# Patient Record
Sex: MALE | Race: WHITE | NOT HISPANIC OR LATINO | Employment: FULL TIME | URBAN - METROPOLITAN AREA
[De-identification: names, ages, dates, MRNs, and addresses within clinical notes are randomized per-mention and may not be internally consistent; named-entity substitution may affect disease eponyms.]

---

## 2021-08-02 ENCOUNTER — OFFICE VISIT (OUTPATIENT)
Dept: FAMILY MEDICINE | Facility: CLINIC | Age: 56
End: 2021-08-02
Payer: COMMERCIAL

## 2021-08-02 VITALS
HEART RATE: 84 BPM | OXYGEN SATURATION: 97 % | HEIGHT: 72 IN | BODY MASS INDEX: 30.2 KG/M2 | DIASTOLIC BLOOD PRESSURE: 84 MMHG | TEMPERATURE: 98.5 F | WEIGHT: 223 LBS | SYSTOLIC BLOOD PRESSURE: 184 MMHG

## 2021-08-02 DIAGNOSIS — Z00.00 HEALTH MAINTENANCE EXAMINATION: Primary | ICD-10-CM

## 2021-08-02 DIAGNOSIS — Z12.5 SCREENING FOR PROSTATE CANCER: ICD-10-CM

## 2021-08-02 DIAGNOSIS — Z12.11 COLON CANCER SCREENING: ICD-10-CM

## 2021-08-02 DIAGNOSIS — M10.9 GOUT, UNSPECIFIED CAUSE, UNSPECIFIED CHRONICITY, UNSPECIFIED SITE: ICD-10-CM

## 2021-08-02 DIAGNOSIS — I10 BENIGN ESSENTIAL HYPERTENSION: ICD-10-CM

## 2021-08-02 LAB
ERYTHROCYTE [DISTWIDTH] IN BLOOD BY AUTOMATED COUNT: 12.4 % (ref 10–15)
HCT VFR BLD AUTO: 38.9 % (ref 40–53)
HGB BLD-MCNC: 13 G/DL (ref 13.3–17.7)
MCH RBC QN AUTO: 31.3 PG (ref 26.5–33)
MCHC RBC AUTO-ENTMCNC: 33.4 G/DL (ref 31.5–36.5)
MCV RBC AUTO: 94 FL (ref 78–100)
PLATELET # BLD AUTO: 185 10E3/UL (ref 150–450)
RBC # BLD AUTO: 4.16 10E6/UL (ref 4.4–5.9)
WBC # BLD AUTO: 8.2 10E3/UL (ref 4–11)

## 2021-08-02 PROCEDURE — 85027 COMPLETE CBC AUTOMATED: CPT | Performed by: FAMILY MEDICINE

## 2021-08-02 PROCEDURE — 82274 ASSAY TEST FOR BLOOD FECAL: CPT | Performed by: FAMILY MEDICINE

## 2021-08-02 PROCEDURE — 80061 LIPID PANEL: CPT | Performed by: FAMILY MEDICINE

## 2021-08-02 PROCEDURE — 99386 PREV VISIT NEW AGE 40-64: CPT | Performed by: FAMILY MEDICINE

## 2021-08-02 PROCEDURE — 80053 COMPREHEN METABOLIC PANEL: CPT | Performed by: FAMILY MEDICINE

## 2021-08-02 PROCEDURE — G0103 PSA SCREENING: HCPCS | Performed by: FAMILY MEDICINE

## 2021-08-02 PROCEDURE — 36415 COLL VENOUS BLD VENIPUNCTURE: CPT | Performed by: FAMILY MEDICINE

## 2021-08-02 PROCEDURE — 84550 ASSAY OF BLOOD/URIC ACID: CPT | Performed by: FAMILY MEDICINE

## 2021-08-02 RX ORDER — LISINOPRIL 20 MG/1
20 TABLET ORAL DAILY
Qty: 90 TABLET | Refills: 1 | Status: SHIPPED | OUTPATIENT
Start: 2021-08-02 | End: 2022-01-22

## 2021-08-02 RX ORDER — LISINOPRIL 20 MG/1
20 TABLET ORAL
COMMUNITY
Start: 2021-03-12 | End: 2021-08-02

## 2021-08-02 RX ORDER — AMLODIPINE BESYLATE 10 MG/1
10 TABLET ORAL
COMMUNITY
Start: 2021-03-12 | End: 2021-08-02

## 2021-08-02 RX ORDER — AMLODIPINE BESYLATE 10 MG/1
10 TABLET ORAL DAILY
Qty: 90 TABLET | Refills: 1 | Status: SHIPPED | OUTPATIENT
Start: 2021-08-02 | End: 2022-01-22

## 2021-08-02 ASSESSMENT — ENCOUNTER SYMPTOMS
HEADACHES: 0
FREQUENCY: 0
HEMATOCHEZIA: 0
COUGH: 0
HEMATURIA: 0
CONSTIPATION: 0
HEARTBURN: 0
EYE PAIN: 0
WEAKNESS: 0
PARESTHESIAS: 0
ARTHRALGIAS: 0
NERVOUS/ANXIOUS: 1
MYALGIAS: 0
ABDOMINAL PAIN: 0
NAUSEA: 0
CHILLS: 0
JOINT SWELLING: 1
DIARRHEA: 0
DYSURIA: 0
PALPITATIONS: 0
SHORTNESS OF BREATH: 0
SORE THROAT: 0
FEVER: 0
DIZZINESS: 0

## 2021-08-02 ASSESSMENT — MIFFLIN-ST. JEOR: SCORE: 1879.52

## 2021-08-02 NOTE — LETTER
August 10, 2021      Keith Medeiros  [address]  [facility] [address]            Dear Keith,     Your FIT test for colon cancer screening is negative. Please plan annual surveillance with FIT test.     Thanks,     Resulted Orders   CBC with platelets   Result Value Ref Range    WBC Count 8.2 4.0 - 11.0 10e3/uL    RBC Count 4.16 (L) 4.40 - 5.90 10e6/uL    Hemoglobin 13.0 (L) 13.3 - 17.7 g/dL    Hematocrit 38.9 (L) 40.0 - 53.0 %    MCV 94 78 - 100 fL    MCH 31.3 26.5 - 33.0 pg    MCHC 33.4 31.5 - 36.5 g/dL    RDW 12.4 10.0 - 15.0 %    Platelet Count 185 150 - 450 10e3/uL   Comprehensive metabolic panel (BMP + Alb, Alk Phos, ALT, AST, Total. Bili, TP)   Result Value Ref Range    Sodium 138 133 - 144 mmol/L    Potassium 3.7 3.4 - 5.3 mmol/L    Chloride 107 94 - 109 mmol/L    Carbon Dioxide (CO2) 22 20 - 32 mmol/L    Anion Gap 9 3 - 14 mmol/L    Urea Nitrogen 11 7 - 30 mg/dL    Creatinine 1.01 0.66 - 1.25 mg/dL    Calcium 9.2 8.5 - 10.1 mg/dL    Glucose 92 70 - 99 mg/dL    Alkaline Phosphatase 57 40 - 150 U/L    AST 26 0 - 45 U/L    ALT 51 0 - 70 U/L    Protein Total 7.4 6.8 - 8.8 g/dL    Albumin 3.9 3.4 - 5.0 g/dL    Bilirubin Total 0.3 0.2 - 1.3 mg/dL    GFR Estimate 83 >60 mL/min/1.73m2      Comment:      As of July 11, 2021, eGFR is calculated by the CKD-EPI creatinine equation, without race adjustment. eGFR can be influenced by muscle mass, exercise, and diet. The reported eGFR is an estimation only and is only applicable if the renal function is stable.   Lipid panel reflex to direct LDL Fasting   Result Value Ref Range    Cholesterol 222 (H) <200 mg/dL      Comment:      Age 0-19 years  Desirable: <170 mg/dL  Borderline high:  170-199 mg/dl  High:            >199 mg/dl    Age 20 years and older  Desirable: <200 mg/dL    Triglycerides 336 (H) <150 mg/dL      Comment:      0-9 years:  Normal:    Less than 75 mg/dL  Borderline high:  75-99 mg/dL  High:             Greater than or equal to 100 mg/dL    0-19  years:  Normal:    Less than 90 mg/dL  Borderline high:   mg/dL  High:             Greater than or equal to 130 mg/dL    20 years and older:  Normal:    Less than 150 mg/dL  Borderline high:  150-199 mg/dL  High:             200-499 mg/dL  Very high:   Greater than or equal to 500 mg/dL    Direct Measure HDL 43 >=40 mg/dL      Comment:      0-19 years:       Greater than or equal to 45 mg/dL   Low: Less than 40 mg/dL   Borderline low: 40-44 mg/dL     20 years and older:   Female: Greater than or equal to 50 mg/dL   Male:   Greater than or equal to 40 mg/dL         LDL Cholesterol Calculated 112 (H) <=100 mg/dL      Comment:      Age 0-19 years:  Desirable: 0-110 mg/dL   Borderline high: 110-129 mg/dL   High: >= 130 mg/dL    Age 20 years and older:  Desirable: <100mg/dL  Above desirable: 100-129 mg/dL   Borderline high: 130-159 mg/dL   High: 160-189 mg/dL   Very high: >= 190 mg/dL    Non HDL Cholesterol 179 (H) <130 mg/dL      Comment:      0-19 years:  Desirable:          Less than 120 mg/dL  Borderline high:   120-144 mg/dL  High:                   Greater than or equal to 145 mg/dL    20 years and older:  Desirable:          130 mg/dL  Above Desirable: 130-159 mg/dL  Borderline high:   160-189 mg/dL  High:               190-219 mg/dL  Very high:     Greater than or equal to 220 mg/dL    Patient Fasting > 8hrs? No    PSA, screen   Result Value Ref Range    Prostate Specific Antigen Screen 2.20 0.00 - 4.00 ug/L   Uric acid   Result Value Ref Range    Uric Acid 6.8 3.5 - 7.2 mg/dL   Fecal colorectal cancer screen (FIT)   Result Value Ref Range    Occult Blood Screen FIT Negative Negative       If you have any questions or concerns, please call the clinic at the number listed above.       Sincerely,      Lang Hernadez MD

## 2021-08-02 NOTE — PROGRESS NOTES
SUBJECTIVE:   CC: Keith Medeiros is an 56 year old male who presents for preventative health visit.       Patient has been advised of split billing requirements and indicates understanding: Yes  Healthy Habits:     Getting at least 3 servings of Calcium per day:  Yes    Bi-annual eye exam:  Yes    Dental care twice a year:  NO    Sleep apnea or symptoms of sleep apnea:  None    Diet:  Regular (no restrictions)    Frequency of exercise:  None    Taking medications regularly:  Yes    Medication side effects:  None    PHQ-2 Total Score: 0    Additional concerns today:  No      Today's PHQ-2 Score:   PHQ-2 ( 1999 [employer]) 8/2/2021   Q1: Little interest or pleasure in doing things 0   Q2: Feeling down, depressed or hopeless 0   PHQ-2 Score 0   Q1: Little interest or pleasure in doing things Not at all   Q2: Feeling down, depressed or hopeless Not at all   PHQ-2 Score 0       Abuse: Current or Past(Physical, Sexual or Emotional)- No  Do you feel safe in your environment? Yes        Social History     Tobacco Use     Smoking status: Current Every Day Smoker     Types: Cigars   Substance Use Topics     Alcohol use: Yes         Alcohol Use 8/2/2021   Prescreen: >3 drinks/day or >7 drinks/week? Yes   AUDIT SCORE  12     AUDIT - Alcohol Use Disorders Identification Test - Reproduced from the World Health Organization Audit 2001 (Second Edition) 8/2/2021   1.  How often do you have a drink containing alcohol? 2 to 3 times a week   2.  How many drinks containing alcohol do you have on a typical day when you are drinking? 5 or 6   3.  How often do you have five or more drinks on one occasion? Weekly   4.  How often during the last year have you found that you were not able to stop drinking once you had started? Never   5.  How often during the last year have you failed to do what was normally expected of you because of drinking? Never   6.  How often during the last year have you needed a first drink in the morning to get yourself  going after a heavy drinking session? Never   7.  How often during the last year have you had a feeling of guilt or remorse after drinking? Never   8.  How often during the last year have you been unable to remember what happened the night before because of your drinking? Never   9.  Have you or someone else been injured because of your drinking? No   10. Has a relative, friend, doctor or other health care worker been concerned about your drinking or suggested you cut down? Yes, during the last year   TOTAL SCORE 12       Last PSA: No results found for: PSA    Reviewed orders with patient. Reviewed health maintenance and updated orders accordingly - Yes  BP Readings from Last 3 Encounters:   08/02/21 (!) 184/84   05/31/07 133/84    Wt Readings from Last 3 Encounters:   08/02/21 101.2 kg (223 lb)   05/31/07 95.8 kg (211 lb 4 oz)                  There is no problem list on file for this patient.    No past surgical history on file.    Social History     Tobacco Use     Smoking status: Current Some Day Smoker     Types: Cigars     Smokeless tobacco: Never Used   Substance Use Topics     Alcohol use: Yes     No family history on file.      Current Outpatient Medications   Medication Sig Dispense Refill     amLODIPine (NORVASC) 10 MG tablet Take 1 tablet (10 mg) by mouth daily 90 tablet 1     lisinopril (ZESTRIL) 20 MG tablet Take 1 tablet (20 mg) by mouth daily 90 tablet 1     SEROQUEL 25 MG OR TABS 1 in the monring, 2 at night (Patient not taking: No sig reported) 0 0     No Known Allergies  No lab results found.     Reviewed and updated as needed this visit by clinical staff                 Reviewed and updated as needed this visit by Provider                No past medical history on file.   No past surgical history on file.    Review of Systems   Constitutional: Negative for chills and fever.   HENT: Negative for congestion, ear pain, hearing loss and sore throat.    Eyes: Negative for pain and visual disturbance.    Respiratory: Negative for cough and shortness of breath.    Cardiovascular: Negative for chest pain, palpitations and peripheral edema.   Gastrointestinal: Negative for abdominal pain, constipation, diarrhea, heartburn, hematochezia and nausea.   Genitourinary: Negative for dysuria, frequency, genital sores, hematuria and urgency.   Musculoskeletal: Positive for joint swelling. Negative for arthralgias and myalgias.   Skin: Negative for rash.   Neurological: Negative for dizziness, weakness, headaches and paresthesias.   Psychiatric/Behavioral: Negative for mood changes. The patient is nervous/anxious.      CONSTITUTIONAL: NEGATIVE for fever, chills, change in weight  INTEGUMENTARY/SKIN: NEGATIVE for worrisome rashes, moles or lesions  EYES: NEGATIVE for vision changes or irritation  ENT: NEGATIVE for ear, mouth and throat problems  RESP: NEGATIVE for significant cough or SOB  CV: NEGATIVE for chest pain, palpitations or peripheral edema  GI: NEGATIVE for nausea, abdominal pain, heartburn, or change in bowel habits   male: negative for dysuria, hematuria, decreased urinary stream, erectile dysfunction, urethral discharge  MUSCULOSKELETAL: NEGATIVE for significant arthralgias or myalgia  NEURO: NEGATIVE for weakness, dizziness or paresthesias  PSYCHIATRIC: NEGATIVE for changes in mood or affect    OBJECTIVE:   There were no vitals taken for this visit.    Physical Exam  GENERAL: healthy, alert and no distress  EYES: Eyes grossly normal to inspection, PERRL and conjunctivae and sclerae normal  HENT: ear canals and TM's normal, nose and mouth without ulcers or lesions  NECK: no adenopathy, no asymmetry, masses, or scars and thyroid normal to palpation  RESP: lungs clear to auscultation - no rales, rhonchi or wheezes  CV: regular rate and rhythm, normal S1 S2, no S3 or S4, no murmur, click or rub, no peripheral edema and peripheral pulses strong  ABDOMEN: soft, nontender, no hepatosplenomegaly, no masses and bowel  sounds normal   (male): normal male genitalia without lesions or urethral discharge, no hernia  MS: no gross musculoskeletal defects noted, no edema  SKIN: no suspicious lesions or rashes  NEURO: Normal strength and tone, mentation intact and speech normal  BACK: no CVA tenderness, no paralumbar tenderness  PSYCH: mentation appears normal, affect normal/bright  LYMPH: no cervical, supraclavicular, axillary, or inguinal adenopathy        ASSESSMENT/PLAN:       ICD-10-CM    1. Health maintenance examination  Z00.00 CBC with platelets     Comprehensive metabolic panel (BMP + Alb, Alk Phos, ALT, AST, Total. Bili, TP)     Lipid panel reflex to direct LDL Fasting     PSA, screen     Uric acid     Fecal colorectal cancer screen (FIT)     CBC with platelets     Comprehensive metabolic panel (BMP + Alb, Alk Phos, ALT, AST, Total. Bili, TP)     Lipid panel reflex to direct LDL Fasting     PSA, screen     Uric acid   2. Screening for prostate cancer  Z12.5 PSA, screen     PSA, screen   3. Gout, unspecified cause, unspecified chronicity, unspecified site  M10.9 Uric acid     Uric acid   4. Benign essential hypertension  I10 amLODIPine (NORVASC) 10 MG tablet     lisinopril (ZESTRIL) 20 MG tablet   5. Colon cancer screening  Z12.11 Fecal colorectal cancer screen (FIT)       Patient has been advised of split billing requirements and indicates understanding: Yes  COUNSELING:   Reviewed preventive health counseling, as reflected in patient instructions    Estimated body mass index is 30.24 kg/m  as calculated from the following:    Height as of this encounter: 1.829 m (6').    Weight as of this encounter: 101.2 kg (223 lb).     Weight management plan: Discussed healthy diet and exercise guidelines    He reports that he has been smoking cigars. He has never used smokeless tobacco.  Tobacco Cessation Action Plan:   Information offered: Patient not interested at this time      Counseling Resources:  ATP IV Guidelines  Pooled Cohorts  Equation Calculator  FRAX Risk Assessment  ICSI Preventive Guidelines  Dietary Guidelines for Americans, 2010  [employer]'s MyPlate  ASA Prophylaxis  Lung CA Screening    Lang Hernadez MD  [facility]

## 2021-08-03 LAB
ALBUMIN SERPL-MCNC: 3.9 G/DL (ref 3.4–5)
ALP SERPL-CCNC: 57 U/L (ref 40–150)
ALT SERPL W P-5'-P-CCNC: 51 U/L (ref 0–70)
ANION GAP SERPL CALCULATED.3IONS-SCNC: 9 MMOL/L (ref 3–14)
AST SERPL W P-5'-P-CCNC: 26 U/L (ref 0–45)
BILIRUB SERPL-MCNC: 0.3 MG/DL (ref 0.2–1.3)
BUN SERPL-MCNC: 11 MG/DL (ref 7–30)
CALCIUM SERPL-MCNC: 9.2 MG/DL (ref 8.5–10.1)
CHLORIDE BLD-SCNC: 107 MMOL/L (ref 94–109)
CHOLEST SERPL-MCNC: 222 MG/DL
CO2 SERPL-SCNC: 22 MMOL/L (ref 20–32)
CREAT SERPL-MCNC: 1.01 MG/DL (ref 0.66–1.25)
FASTING STATUS PATIENT QL REPORTED: NO
GFR SERPL CREATININE-BSD FRML MDRD: 83 ML/MIN/1.73M2
GLUCOSE BLD-MCNC: 92 MG/DL (ref 70–99)
HDLC SERPL-MCNC: 43 MG/DL
LDLC SERPL CALC-MCNC: 112 MG/DL
NONHDLC SERPL-MCNC: 179 MG/DL
POTASSIUM BLD-SCNC: 3.7 MMOL/L (ref 3.4–5.3)
PROT SERPL-MCNC: 7.4 G/DL (ref 6.8–8.8)
PSA SERPL-MCNC: 2.2 UG/L (ref 0–4)
SODIUM SERPL-SCNC: 138 MMOL/L (ref 133–144)
TRIGL SERPL-MCNC: 336 MG/DL
URATE SERPL-MCNC: 6.8 MG/DL (ref 3.5–7.2)

## 2021-08-06 ENCOUNTER — TELEPHONE (OUTPATIENT)
Dept: FAMILY MEDICINE | Facility: CLINIC | Age: 56
End: 2021-08-06

## 2021-08-06 DIAGNOSIS — M10.9 GOUT, UNSPECIFIED CAUSE, UNSPECIFIED CHRONICITY, UNSPECIFIED SITE: Primary | ICD-10-CM

## 2021-08-06 LAB — HEMOCCULT STL QL IA: NEGATIVE

## 2021-08-06 RX ORDER — ALLOPURINOL 100 MG/1
100 TABLET ORAL DAILY
Qty: 90 TABLET | Refills: 1 | Status: SHIPPED | OUTPATIENT
Start: 2021-08-06 | End: 2022-01-22

## 2021-08-06 NOTE — TELEPHONE ENCOUNTER
Patient was waiting for lab results to get a prescription for allopurinol    He is ok with that, he thought you would choose the dose based on his labs    Can anyone fill this as soon as possible---today  to [facility] [address]

## 2021-08-06 NOTE — TELEPHONE ENCOUNTER
Sending to triage to assess further, pt called back wanting this filled as soon as we can thank you.   Michelle Whitney

## 2021-08-06 NOTE — TELEPHONE ENCOUNTER
John Mendieta MD 11 minutes ago (12:50 PM)   AN     Medication 100 mg sent, he should follow up with his PCP in 3-6 months.        S/w pt and advised Dr. Mendieta sent in rx for allopurinol 100 mg take 1 tab daily # 90 with 1 refill and to f/u with Dr. Hernadez in 3-6 months.    Pt states understanding.    Alissa YEUNG RN  EP Triage

## 2021-08-06 NOTE — TELEPHONE ENCOUNTER
As was noted, your uric acid level is normal. However, your gout recur frequently, we may consider having you to take low dose of allopurinol. Please let us know your thoughts.   Message above copy and pasted from lab results.   Michelle Whitney

## 2021-08-06 NOTE — TELEPHONE ENCOUNTER
Routing to provider. Patient is interested in starting allopurinol. He does not have mychart and did not receive lab results.     Triage: Relay lab result message to patient when calling back about allopurinol rx.

## 2021-09-20 ENCOUNTER — TELEPHONE (OUTPATIENT)
Dept: FAMILY MEDICINE | Facility: CLINIC | Age: 56
End: 2021-09-20

## 2021-09-20 DIAGNOSIS — F32.1 CURRENT MODERATE EPISODE OF MAJOR DEPRESSIVE DISORDER, UNSPECIFIED WHETHER RECURRENT (H): Primary | ICD-10-CM

## 2021-09-20 NOTE — TELEPHONE ENCOUNTER
Pt calling requesting a referral to Mental Health for anxiety/depression and starting medication.    Referral pended.    Alissa YEUNG RN  EP Triage

## 2021-09-22 ENCOUNTER — VIRTUAL VISIT (OUTPATIENT)
Dept: PSYCHIATRY | Facility: CLINIC | Age: 56
End: 2021-09-22
Attending: FAMILY MEDICINE
Payer: COMMERCIAL

## 2021-09-22 ENCOUNTER — TELEPHONE (OUTPATIENT)
Dept: PSYCHIATRY | Facility: CLINIC | Age: 56
End: 2021-09-22

## 2021-09-22 DIAGNOSIS — F32.1 CURRENT MODERATE EPISODE OF MAJOR DEPRESSIVE DISORDER, UNSPECIFIED WHETHER RECURRENT (H): ICD-10-CM

## 2021-09-22 PROCEDURE — 90792 PSYCH DIAG EVAL W/MED SRVCS: CPT | Mod: 95 | Performed by: STUDENT IN AN ORGANIZED HEALTH CARE EDUCATION/TRAINING PROGRAM

## 2021-09-22 RX ORDER — SERTRALINE HYDROCHLORIDE 25 MG/1
TABLET, FILM COATED ORAL
Qty: 60 TABLET | Refills: 1 | Status: SHIPPED | OUTPATIENT
Start: 2021-09-22 | End: 2021-09-22

## 2021-09-22 NOTE — PROGRESS NOTES
"Keith Medeiros is a 56 year old male who is being evaluated via a billable video visit.      The patient has been notified of following:     \"This video visit will be conducted via a call between you and your physician/provider. We have found that certain health care needs can be provided without the need for an in-person physical exam.  This service lets us provide the care you need with a video conversation.  If a prescription is necessary we can send it directly to your pharmacy.  If lab work is needed we can place an order for that and you can then stop by our lab to have the test done at a later time.    If during the course of the call the physician/provider feels a video visit is not appropriate, you will not be charged for this service.\"     Physician has received verbal consent for a Video Visit from the patient? Yes    Patient would like the video invitation sent by: Send to e-mail at: [email or web address]  Video Start Time: September 22, 2021 10:18 AM  Video End Time: September 22, 2021 11:10 AM    Identifying Data:  Keith Medeiros complains of  No chief complaint on file.      Patient is a 56 year old    White  male  who presents for initial visit with me.  Patient is currently employed full time. Discussed limits of confidentiality today. Patient prefers to be called: \"Enrique.\"     I have reviewed and updated the patient's Past Medical History, Social History, Family History, Allergies, and Medication List.    Source of Referral:  Primary Care Provider: Lang Hernadez MD   Current Psychotherapist: none    Telemedicine Visit: The patient's condition can be safely assessed and treated via synchronous audio and visual telemedicine encounter.  Consultation will be provided at the request of Lang Hernadez MD for advice regarding the diagnosis and treatment of this patient's mental health needs. [facility] has written policies and procedures specific to telemedicine services that have been reviewed " "and are updated regularly. There are specific policies and procedures that adequately address patient safety before, during, and after the telemedicine service is rendered. As the provider, I attest to compliance with applicable laws and regulations related to telemedicine.     Reason for Telemedicine Visit: Patient convenience (e.g. access to timely appointments / distance to available provider) and current National State of Emergency with COVID19 and [place] Governor placed a state-wide stay home advisory where only essential personnel are to be in the community.    Originating Site (Patient Location): Patient's home     Distant Site (Provider Location): Provider Remote Setting- Home Office    Consent:  The patient/guardian has verbally consented to: the potential risks and benefits of telemedicine (video visit) versus in person care; bill my insurance or make self-payment for services provided; and responsibility for payment of non-covered services.     Mode of Communication:  Video Conference via TAPP. Please note we dealt with extensive technical difficulties causing late appointment start.    HPI:  He struggles at first to describe his exact symptoms, but then starts by talking about how he identifies as an alcoholic, and has not had a drink for 2 weeks now. He is \"tired of feeling this way\" and feels \"something has to change.\" Most concerning to him is how he feels \"exasperated with people for no reason.\" Feels like he is lashing out irrationally, doesn't know why he does it, then becomes cognizant of it and feels very guilty. 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    He feels that symptoms of anxiety, including nausea, feelings of dread, and \"feeling bad,\" and mood symptoms including irritability. He struggles to turn his brain off " "at night, ruminations keep him awake, often related to guilt about irritability. He denies SI or thoughts of death, motivated to live for his family but wants a better quality of life.    He thinks he was \"pretty anxiety-ridden, depressed kid from an early age, for no particular reason.\" Described childhood as happy, no trauma, no abuse. Feels he has been self-medicating with alcohol over this time, started drinking at age 14. Longest period of sobriety was about one year, when he was living in a sober house. He has been to inpatient/residential treatment several times. He does not like groups. He also felt that AA wasn't particularly helpful for him.      Past diagnoses include: anxiety, depression, alcohol use disorder  Current medications include: has a current medication list which includes the following prescription(s): allopurinol, amlodipine, lisinopril, and seroquel.    Medication side effects: n/a currently not on meds  Current stressors include: Caregiving Stress  Coping mechanisms and supports include: Drugs/Alcohol and Therapy    Psychiatric Review of Symptoms:  Depression: Interest: Decrease  Depressed Mood  Sleep: Decrease   Energy: Decrease  Guilt: Increase  Concentration: Decrease  Suicide: No  Irritability: Increase   Ruminations: Increase    PHQ-9 scores: No flowsheet data found.  Sydnie:  Irritability  Anxiety: Feeling nervous, anxious, or on edge  Uncontrolled worrying  Worrying too much about different things  Trouble relaxing  Easily annoyed or irritable   DAJUAN-7 scores:  No flowsheet data found.  Panic:  No symptoms   Agoraphobia:  No   PTSD:  No symptoms   OCD:  No symptoms   Psychosis: No symptoms   ADD / ADHD: Distractible  Gambling or shoplifting: No   Eating Disorder:  No symptoms  Sleep:   Trouble falling asleep    Behavioral Concerns: No     Psychiatric History:   Hospitalizations: None  Past Treatment: psychiatry  Suicide Attempts: No   Current Suicide Risk:  Suicide Assessment Completed " Today.  Self-injurious Behavior: Denies  Electroconvulsive Therapy (ECT) or Transcranial Magnetic Stimulation (TMS) treatment: No   GeneSight Genetic Testing: No   Attention Deficit Hyperactivity Disorder (ADHD) Testing: No   Neuropsychological Testing: No     Past medication trials include but are not limited to:   New Antidepressants:  Effexor (venlafaxine)  Newer Antipsychotics: Seroquel (quetiapine) for sleep    The [place] Prescription Monitoring Program has been reviewed and there are no concerns about diversionary activity for controlled substances at this time.  No data for controlled substances over the last one year.     Substance Use History:  Current use of drugs or alcohol: 2 weeks since last drink   Patient reported the following problems as a result of drinking: occupational / vocational problems and relationship problems.   Based on the clinical interview, there  are indications of drug or alcohol abuse. see HPI. Continue to monitor.   Discussed effect of substance use on overall health.   Tobacco use: No  Caffeine:  NO  Patient has received chemical dependency treatment in the past at 3 residential programs, a sober house for 1 year  Recovery Programming Involvement: None    Past Medical History:  No past medical history on file.   Surgery: No past surgical history on file.  Allergies: No Known Allergies  Primary Care Provider: Lang Hernadez MD  Seizures or Head Injury: No    Vital Signs:  Vitals: There were no vitals taken for this visit.  No vitals as this is a remote televisit.     Labs:  Most recent laboratory results reviewed and pertinent results include:   Office Visit on 08/02/2021   Component Date Value Ref Range Status     WBC Count 08/02/2021 8.2  4.0 - 11.0 10e3/uL Final     RBC Count 08/02/2021 4.16* 4.40 - 5.90 10e6/uL Final     Hemoglobin 08/02/2021 13.0* 13.3 - 17.7 g/dL Final     Hematocrit 08/02/2021 38.9* 40.0 - 53.0 % Final     MCV 08/02/2021 94  78 - 100 fL Final     MCH  08/02/2021 31.3  26.5 - 33.0 pg Final     MCHC 08/02/2021 33.4  31.5 - 36.5 g/dL Final     RDW 08/02/2021 12.4  10.0 - 15.0 % Final     Platelet Count 08/02/2021 185  150 - 450 10e3/uL Final     Sodium 08/02/2021 138  133 - 144 mmol/L Final     Potassium 08/02/2021 3.7  3.4 - 5.3 mmol/L Final     Chloride 08/02/2021 107  94 - 109 mmol/L Final     Carbon Dioxide (CO2) 08/02/2021 22  20 - 32 mmol/L Final     Anion Gap 08/02/2021 9  3 - 14 mmol/L Final     Urea Nitrogen 08/02/2021 11  7 - 30 mg/dL Final     Creatinine 08/02/2021 1.01  0.66 - 1.25 mg/dL Final     Calcium 08/02/2021 9.2  8.5 - 10.1 mg/dL Final     Glucose 08/02/2021 92  70 - 99 mg/dL Final     Alkaline Phosphatase 08/02/2021 57  40 - 150 U/L Final     AST 08/02/2021 26  0 - 45 U/L Final     ALT 08/02/2021 51  0 - 70 U/L Final     Protein Total 08/02/2021 7.4  6.8 - 8.8 g/dL Final     Albumin 08/02/2021 3.9  3.4 - 5.0 g/dL Final     Bilirubin Total 08/02/2021 0.3  0.2 - 1.3 mg/dL Final     GFR Estimate 08/02/2021 83  >60 mL/min/1.73m2 Final    As of July 11, 2021, eGFR is calculated by the CKD-EPI creatinine equation, without race adjustment. eGFR can be influenced by muscle mass, exercise, and diet. The reported eGFR is an estimation only and is only applicable if the renal function is stable.     Cholesterol 08/02/2021 222* <200 mg/dL Final    Age 0-19 years  Desirable: <170 mg/dL  Borderline high:  170-199 mg/dl  High:            >199 mg/dl    Age 20 years and older  Desirable: <200 mg/dL     Triglycerides 08/02/2021 336* <150 mg/dL Final    0-9 years:  Normal:    Less than 75 mg/dL  Borderline high:  75-99 mg/dL  High:             Greater than or equal to 100 mg/dL    0-19 years:  Normal:    Less than 90 mg/dL  Borderline high:   mg/dL  High:             Greater than or equal to 130 mg/dL    20 years and older:  Normal:    Less than 150 mg/dL  Borderline high:  150-199 mg/dL  High:             200-499 mg/dL  Very high:   Greater than or equal to  500 mg/dL     Direct Measure HDL 08/02/2021 43  >=40 mg/dL Final    0-19 years:       Greater than or equal to 45 mg/dL   Low: Less than 40 mg/dL   Borderline low: 40-44 mg/dL     20 years and older:   Female: Greater than or equal to 50 mg/dL   Male:   Greater than or equal to 40 mg/dL          LDL Cholesterol Calculated 08/02/2021 112* <=100 mg/dL Final    Age 0-19 years:  Desirable: 0-110 mg/dL   Borderline high: 110-129 mg/dL   High: >= 130 mg/dL    Age 20 years and older:  Desirable: <100mg/dL  Above desirable: 100-129 mg/dL   Borderline high: 130-159 mg/dL   High: 160-189 mg/dL   Very high: >= 190 mg/dL     Non HDL Cholesterol 08/02/2021 179* <130 mg/dL Final    0-19 years:  Desirable:          Less than 120 mg/dL  Borderline high:   120-144 mg/dL  High:                   Greater than or equal to 145 mg/dL    20 years and older:  Desirable:          130 mg/dL  Above Desirable: 130-159 mg/dL  Borderline high:   160-189 mg/dL  High:               190-219 mg/dL  Very high:     Greater than or equal to 220 mg/dL     Patient Fasting > 8hrs? 08/02/2021 No   Final     Prostate Specific Antigen Screen 08/02/2021 2.20  0.00 - 4.00 ug/L Final     Uric Acid 08/02/2021 6.8  3.5 - 7.2 mg/dL Final     Occult Blood Screen FIT 08/02/2021 Negative  Negative Final     Most recent EKG from 5/32/07 reviewed. QTc interval 446.  Review of Systems:  10 systems (general, cardiovascular, respiratory, eyes, ENT, endocrine, GI, , M/S, neurological) were reviewed. Most pertinent finding(s) is/are: nausea, decreased appetite . The remaining systems are all unremarkable.    Family History:   Patient reported family history includes: No family history on file.  Mental Illness History: Unknown  Substance Abuse History: Unknown  Suicide History: Denies  Medications: Unknown     Social History:   Current Living situation: [place] , [place] with Spouse/Partner . Feels safe at home.  Children: adult children, one daugther living with them now    Firearms/Weapons Access: No: Patient denies   Service: No    Legal History:  No: Patient denies any legal history    Significant Losses / Trauma / Abuse / Neglect Issues:  There are no indications or report of: significant losses, trauma, abuse or neglect.   Issues of possible neglect are not present.     Mental Status Examination:     Appearance:  awake, alert and neatly groomed  Attitude:  cooperative   Eye Contact:  fair  Psychomotor Behavior:  no evidence of tardive dyskinesia, dystonia, or tics  Oriented to:  time, person, and place  Attention Span and Concentration:  Adequate for interview  Speech:  clear, coherent and normal prosody  Mood:  anxious and sad   Affect:  mood congruent  Associations:  no loose associations  Thought Process:  logical and linear  Thought Content:  no evidence of suicidal ideation or homicidal ideation and no evidence of psychotic thought  Recent and Remote Memory: Not formally assessed. No amnesia.  Fund of Knowledge: appropriate  Insight:  good  Judgment:  intact, adequate for safety  Impulse Control:  intact  Language: Intact    Suicide Risk Assessment:  Today Keith Medeiros reports no SI. In addition, there are notable risk factors for self-harm, including age, anxiety, substance abuse and mood change. However, risk is mitigated by commitment to family, sobriety, ability to volunteer a safety plan, history of seeking help when needed, future oriented, no access to firearms or weapons, identifies reasons to live including family, no family history of suicide and denies homicidal ideation, intent, or plan. Therefore, based on all available evidence including the factors cited above, Keith Medeiros does not appear to be at imminent risk for self-harm, does not meet criteria for a 72-hr hold, and therefore remains appropriate for ongoing outpatient level of care.  A thorough assessment of risk factors related to suicide and self-harm have been reviewed and are noted above.  The patient convincingly denies suicidality on several occasions.  Local community safety resources reviewed for patient to use if needed. There was no deceit detected, and the patient presented in a manner that was believable.     DSM5  Diagnosis:  300.02 (F41.1) Generalized Anxiety Disorder  Substance-Related & Addictive Disorders Alcohol Use Disorder   303.90 (F10.20) Severe most recent use 2 weeks ago    Medical Comorbidities Include: There are no problems to display for this patient.    Psychosocial & Contextual Factors:  caregiver stress    Strengths and Opportunities:   Keith Medeiros identified the following strengths or resources that will help he succeed in counseling: commitment to health and well being, insight, intelligence, motivation and work ethic. Things that may interfere with the patient's success include:  lack of social support.    A 12-item WHODAS 2.0 assessment was completed by the patient and recorded in EPIC.  No flowsheet data found.    There are no language or communication issues or need for modification in treatment.   There are no ethnic, cultural or Buddhism factors that may be relevant for therapy.  Client identified their preferred language to be English.  Client does not need the assistance of an  or other support involved in therapy.    Impression:  Keith Medeiros reports symptoms consistent with diagnosis of a major depressive episode with irritability, as well as lifelong pattern of anxiety. However, he has been using alcohol heavily for most of his adulthood, with use disorder, and his longest period of sobriety was in a controlled environment. Therefore, technically diagnosis of substance-induced major depressive disorder cannot be ruled out, while anxiety does appear to predate the alcohol use. He identifies as an alcoholic and has now been sober for 2 weeks after detoxing on his own, and declines CD treatment at this time, did not find groups helpful, but is willing  to try individual therapy. He is interested in trying medication after not tolerating one antidepressant trial (Effexor) in the past due to nausea. Discussed other first-line options including sertraline which he would like to try.     Medication side effects and alternatives reviewed. Health promotion activities recommended and reviewed today. All questions addressed. Education and counseling completed regarding risks and benefits of medications and psychotherapy options. Recommend therapy for additional support. We also reviewed the [facility] Psychiatry Service model today.      Treatment Plan:     Start sertraline 25 mg PO daily for one week, then increase to 50 mg PO daily    Continue all other medications as reviewed per electronic medical record today.     Safety plan reviewed. To the Emergency Department as needed or call after hours crisis line at [phone] or [phone]. [place] Crisis Text Line: Text [place] to 148671  or  Suicide LifeLine Chat: suicidepreToppr.org/chat    To schedule individual or family therapy, call [facility] at [phone].     Schedule an appointment with me in 4 weeks or sooner as needed.  Call [facility] at [phone] to schedule.    Follow up with primary care provider as planned or sooner if needed for acute medical concerns.    Call the psychiatric nurse line with medication questions or concerns at [phone].    Setgo may be used to communicate with your provider, but this is not intended to be used for emergencies.    Patient Education:  n/a    Community Resources:    National Suicide Prevention Lifeline: 891.269.9093 (TTY: 478.880.6087). Call anytime for help.  (www.suicidepreventionlifeline.org)  National [place] on Mental Illness (www.joon.org): 392.667.9997 or 073-486-7767.   Mental Health Association (www.mentalhealth.org): 352.506.4233 or 602-373-7623.  [place] Crisis Text Line: Text [place] to 870995  Suicide  LifeLine Chat: suicidepreventionlifeline.org/chat    Administrative Billing:   Time spent with patient was 60 minutes and greater than 50% of time or 45 minutes was spent in counseling and coordination of care regarding above diagnoses and treatment plan.    Patient Status:  This is a continuous care patient and medications will be prescribed by the psychiatric provider until further indicated.    Signed:     Sherice Yeager MD  [facility]

## 2021-10-26 ENCOUNTER — VIRTUAL VISIT (OUTPATIENT)
Dept: PSYCHIATRY | Facility: CLINIC | Age: 56
End: 2021-10-26
Payer: COMMERCIAL

## 2021-10-26 DIAGNOSIS — F32.1 CURRENT MODERATE EPISODE OF MAJOR DEPRESSIVE DISORDER, UNSPECIFIED WHETHER RECURRENT (H): ICD-10-CM

## 2021-10-26 PROCEDURE — 99214 OFFICE O/P EST MOD 30 MIN: CPT | Mod: 95 | Performed by: STUDENT IN AN ORGANIZED HEALTH CARE EDUCATION/TRAINING PROGRAM

## 2021-10-26 RX ORDER — SERTRALINE HYDROCHLORIDE 100 MG/1
100 TABLET, FILM COATED ORAL DAILY
Qty: 30 TABLET | Refills: 1 | Status: SHIPPED | OUTPATIENT
Start: 2021-10-26 | End: 2022-01-07

## 2021-10-26 NOTE — PROGRESS NOTES
"Keith is a 56 year old who is being evaluated via a billable video visit.      How would you like to obtain your AVS? MyChart  If the video visit is dropped, the invitation should be resent by: Text to cell phone: [phone]  Will anyone else be joining your video visit? No      Video Start Time: 806  Video-Visit Details    Type of service:  Video Visit    Video End Time:827    Originating Location (pt. Location): Home    Distant Location (provider location):  [facility]     Platform used for Video Visit: [facility]         Outpatient Psychiatric Progress Note    Name: Keith Medeiros   : 1965                    Primary Care Provider: Lang Hernadez MD   Therapist: none     PHQ-9 scores:  No flowsheet data found.    DAJUAN-7 scores:  No flowsheet data found.    Patient Identification:  Patient is a 56 year old,   White  male  who presents for return visit with me.  Patient is currently employed full time. Patient attended the session alone. Patient prefers to be called: \"Enrique\".    Interim History:  I last saw Keith Medeiros for outpatient psychiatry Consultation on 21. During that appointment, we started sertraline.    Since then, Keith reports he is feeling \"just fine, doing pretty good.\" Feels \"a lot better,\" thinks that the change in job really helped (less stressful, this change was made right before our initial appointment) and the medication is helping. He also notes that he has been sober from alcohol for over one month. He notices he is more tired toward the end of the day, but in a positive way. He feels able to relax and feel asleep.    When he bends over, he is prone to feeling lightheaded when standing back up. Has only noticed this since starting sertraline, though he was on antihypertensives prior. No vertigo. No LOC. Reports fainting in the past with injections, none as an adult.    Psychiatric ROS:  Keith Medeiros reports mood has been: improved, sleep " and appetite improved. No SI  Anxiety has been: improved, more manageable  Sleep has been: improved, more refreshing  Sydnie sxs: none  Psychosis sxs: none  ADHD/ADD sxs: none  PTSD sxs: none  PHQ9 and GAD7 scores were not done today.   Medication side effects: Yes: orthostatic lightheadedness, no LOC or falls. Also sexual dysfunction that he does not find bothersome at this time  Current stressors include: Caregiving Stress and Occupational Difficulties  Coping mechanisms and supports include: Family, Hobbies and Friends    Current medications include:   Current Outpatient Medications   Medication Sig     allopurinol (ZYLOPRIM) 100 MG tablet Take 1 tablet (100 mg) by mouth daily     amLODIPine (NORVASC) 10 MG tablet Take 1 tablet (10 mg) by mouth daily     lisinopril (ZESTRIL) 20 MG tablet Take 1 tablet (20 mg) by mouth daily     sertraline (ZOLOFT) 100 MG tablet Take 1 tablet (100 mg) by mouth daily     No current facility-administered medications for this visit.       The [place] Prescription Monitoring Program has been reviewed. Indicates no controlled prescriptions in the past 12 months.    Previous medication trials include but not limited to:  Venlafaxine, quetiapine    Past Medical/Surgical History:  No past medical history on file.   has no past medical history on file.    Social History:  Current Living situation:  [place] , [place] with Spouse/Partner and Daughter.  Current use of drugs or alcohol: no alcohol in over one month   Tobacco use: No    Vital Signs:   There were no vitals taken for this visit.    Labs:  Most recent laboratory results reviewed and no new labs.     Review of Systems:  10 systems (general, cardiovascular, respiratory, eyes, ENT, endocrine, GI, , M/S, neurological) were reviewed. Most pertinent finding(s) is/are: orthostatic lightheadedness . The remaining systems are all unremarkable.    Mental Status Examination:  Appearance: awake, alert, adequately groomed, appeared stated  "age and no apparent distress  Attitude:  cooperative   Eye Contact:  good  Gait and Station: normal, no gross abnormalities noted by observation  Psychomotor Behavior:  no evidence of tardive dyskinesia, dystonia, or tics  Oriented to:  person, place, time, and situation  Attention Span and Concentration:  normal  Speech:  clear, coherent, regular rate, rhythm, and volume  Language: intact  Mood:  \"better\"  Affect:  appropriate and in normal range, mood congruent and restricted  Associations:  no loose associations  Thought Process:  logical, linear and goal oriented  Thought Content:  no evidence of suicidal ideation or homicidal ideation, no evidence of psychotic thought, no auditory hallucinations present and no visual hallucinations present  Recent and Remote Memory:  Intact to interview. Not formally assessed. No amnesia.  Fund of Knowledge: appropriate  Insight:  good  Judgment:  intact, adequate for safety  Impulse Control:  intact    Suicide Risk Assessment:  Today Keith Medeiros reports no SI. In addition, there are notable risk factors for self-harm, including age and anxiety. However, risk is mitigated by commitment to family, sobriety, absence of past attempts, ability to volunteer a safety plan, history of seeking help when needed, future oriented, no access to firearms or weapons, identifies reasons to live including family, denies suicidal intent or plan, no family history of suicide and denies homicidal ideation, intent, or plan. Therefore, based on all available evidence including the factors cited above, Keith Medeiros does not appear to be at imminent risk for self-harm, does not meet criteria for a 72-hr hold, and therefore remains appropriate for ongoing outpatient level of care.  A thorough assessment of risk factors related to suicide and self-harm have been reviewed and are noted above. The patient convincingly denies suicidality on several occasions. Local community safety resources printed " and reviewed for patient to use if needed. There was no deceit detected, and the patient presented in a manner that was believable.     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    Psychosocial & Contextual Factors:  Occupational Difficulties and caregiving stress    Assessment:  Keith Medeiros reports interim improvement in depression and anxiety symptoms, likely related to abstinence from alcohol, but also possibly attributable to initiation of sertraline. Sleep and baseline anxiety in particular are better and likely to continue to improve with continued sobriety, but could also benefit from titration of sertraline. Continue to recommend therapy, which pt declines, but would consider self-directed workbook. Provided recommendations of Sebastian Rodriguez's CBT workbooks, particularly directed at anxiety.     Medication side effects and alternatives were reviewed. Health promotion activities recommended and reviewed today. All questions addressed. Education and counseling completed regarding risks and benefits of medications and psychotherapy options. Recommend therapy for additional support.     Treatment Plan:    Increase sertraline to 100 mg daily     Continue other medications per primary care provider.     Continue all other medications as reviewed per electronic medical record today.     Safety plan reviewed. To the Emergency Department as needed or call after hours crisis line at [phone] or [phone]. [place] Crisis Text Line. Text [place] to 613569 or Suicide LifeLine Chat: suicidepreventionlifeline.org/chat    Schedule an appointment with me in 4 weeks or sooner as needed. Call [facility] at [phone] to  schedule.    Follow up with primary care provider as planned or for acute medical concerns.    Call the psychiatric nurse line with medication questions or concerns at [phone].    [employer] may be used to communicate with your provider, but this is not intended to be used for emergencies.    Administrative Billing:   Time spent with patient was 30 minutes and greater than 50% of time or 20 minutes was spent in counseling and coordination of care regarding above diagnoses and treatment plan.    Patient Status:  Patient will continue to be seen for ongoing consultation and stabilization.    Signed:     Sherice Yeager MD  [facility]    Disclaimer: This note consists of symbols derived from keyboarding, dictation and/or voice recognition software. As a result, there may be errors in the script that have gone undetected. Please consider this when interpreting information found in this chart.

## 2022-01-07 ENCOUNTER — VIRTUAL VISIT (OUTPATIENT)
Dept: PSYCHIATRY | Facility: CLINIC | Age: 57
End: 2022-01-07
Payer: COMMERCIAL

## 2022-01-07 DIAGNOSIS — F32.1 CURRENT MODERATE EPISODE OF MAJOR DEPRESSIVE DISORDER, UNSPECIFIED WHETHER RECURRENT (H): ICD-10-CM

## 2022-01-07 PROCEDURE — 99214 OFFICE O/P EST MOD 30 MIN: CPT | Mod: 95 | Performed by: STUDENT IN AN ORGANIZED HEALTH CARE EDUCATION/TRAINING PROGRAM

## 2022-01-07 RX ORDER — SERTRALINE HYDROCHLORIDE 100 MG/1
100 TABLET, FILM COATED ORAL DAILY
Qty: 30 TABLET | Refills: 1 | Status: SHIPPED | OUTPATIENT
Start: 2022-01-07 | End: 2022-03-09

## 2022-01-07 NOTE — PROGRESS NOTES
"Keith is a 56 year old who is being evaluated via a billable video visit.      How would you like to obtain your AVS? Mail a copy  If the video visit is dropped, the invitation should be resent by: Text to cell phone: [phone]  Will anyone else be joining your video visit? No      Video Start Time: 12:36 PM  Video-Visit Details    Type of service:  Video Visit    Video End Time:1:00 PM    Originating Location (pt. Location): Home    Distant Location (provider location):  [facility]     Platform used for Video Visit: [facility]           Outpatient Psychiatric Progress Note    Name: Keith Medeiros   : 1965                    Primary Care Provider: Lang Hernadez MD   Therapist: none     PHQ-9 scores:  No flowsheet data found.    DAJUAN-7 scores:  No flowsheet data found.    Patient Identification:  Patient is a 56 year old,   White  male  who presents for return visit with me.  Patient is currently employed full time. Patient attended the session alone. Patient prefers to be called: \"Enrique\".    Interim History:  I last saw Keith Medeiros for outpatient psychiatry Return Visit on 10/2621. During that appointment, we increased sertraline.    Since then, Keith reports he is feeling \"just fine, doing pretty good.\" Has noticed feeling more tired in the evening and has been waking up a few times per night. Sleeping minimum 6 hours. He continues to have some lightheadedness when he stands up. No losing consciousness, or falling hitting his head.     Work is going well, working from home. No cravings, no thinking about drinking.      Feels \"a little more talkative and more outgoing. Less anxious.\" Not as easily aggravated.     Psychiatric ROS:  Keith Medeiros reports mood has been: improved, sleep and appetite improved. No SI  Anxiety has been: improved, more manageable  Sleep has been: improved, more refreshing  Sydnie sxs: none  Psychosis sxs: none  ADHD/ADD sxs: none  PTSD sxs: " none  PHQ9 and GAD7 scores were not done today.   Medication side effects: Yes: orthostatic lightheadedness, no LOC or falls. Also sexual dysfunction that he does not find bothersome at this time  Current stressors include: Caregiving Stress and Occupational Difficulties  Coping mechanisms and supports include: Family, Hobbies and Friends    Current medications include:   Current Outpatient Medications   Medication Sig     allopurinol (ZYLOPRIM) 100 MG tablet Take 1 tablet (100 mg) by mouth daily     amLODIPine (NORVASC) 10 MG tablet Take 1 tablet (10 mg) by mouth daily     lisinopril (ZESTRIL) 20 MG tablet Take 1 tablet (20 mg) by mouth daily     sertraline (ZOLOFT) 100 MG tablet Take 1 tablet (100 mg) by mouth daily     No current facility-administered medications for this visit.       The [place] Prescription Monitoring Program has been reviewed. Indicates no controlled prescriptions in the past 12 months.    Previous medication trials include but not limited to:  Venlafaxine, quetiapine    Past Medical/Surgical History:  No past medical history on file.   has no past medical history on file.    Social History:  Current Living situation:  [place] , [place] with Spouse/Partner and Daughter.  Current use of drugs or alcohol: no alcohol in over one month   Tobacco use: No    Vital Signs:   There were no vitals taken for this visit.    Labs:  Most recent laboratory results reviewed and no new labs.     Review of Systems:  10 systems (general, cardiovascular, respiratory, eyes, ENT, endocrine, GI, , M/S, neurological) were reviewed. Most pertinent finding(s) is/are: orthostatic lightheadedness . The remaining systems are all unremarkable.    Mental Status Examination:  Appearance: awake, alert, adequately groomed, appeared stated age and no apparent distress  Attitude:  cooperative   Eye Contact:  good  Gait and Station: normal, no gross abnormalities noted by observation  Psychomotor Behavior:  no evidence of  "tardive dyskinesia, dystonia, or tics  Oriented to:  person, place, time, and situation  Attention Span and Concentration:  normal  Speech:  clear, coherent, regular rate, rhythm, and volume  Language: intact  Mood:  \"better\"  Affect:  appropriate and in normal range, mood congruent and restricted  Associations:  no loose associations  Thought Process:  logical, linear and goal oriented  Thought Content:  no evidence of suicidal ideation or homicidal ideation, no evidence of psychotic thought, no auditory hallucinations present and no visual hallucinations present  Recent and Remote Memory:  Intact to interview. Not formally assessed. No amnesia.  Fund of Knowledge: appropriate  Insight:  good  Judgment:  intact, adequate for safety  Impulse Control:  intact    Suicide Risk Assessment:  Today Keith Medeiros reports no SI. In addition, there are notable risk factors for self-harm, including age and anxiety. However, risk is mitigated by commitment to family, sobriety, absence of past attempts, ability to volunteer a safety plan, history of seeking help when needed, future oriented, no access to firearms or weapons, identifies reasons to live including family, denies suicidal intent or plan, no family history of suicide and denies homicidal ideation, intent, or plan. Therefore, based on all available evidence including the factors cited above, Keith Medeiros does not appear to be at imminent risk for self-harm, does not meet criteria for a 72-hr hold, and therefore remains appropriate for ongoing outpatient level of care.  A thorough assessment of risk factors related to suicide and self-harm have been reviewed and are noted above. The patient convincingly denies suicidality on several occasions. Local community safety resources printed and reviewed for patient to use if needed. There was no deceit detected, and the patient presented in a manner that was believable.     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    Psychosocial & Contextual Factors:  Occupational Difficulties and caregiving stress    Assessment:  Keith Medeiros reports overall interim improvement in depression and anxiety symptoms, likely related to abstinence from alcohol, but also possibly attributable to sertraline titration. Sleep and baseline anxiety in particular are better and likely to continue to improve with continued sobriety. Continue to recommend therapy, which pt feels he remains too busy for, but would consider self-directed workbook. Provided recommendations of Sebastian Rodriguez's CBT workbooks, particularly directed at anxiety.     Medication side effects and alternatives were reviewed. Health promotion activities recommended and reviewed today. All questions addressed. Education and counseling completed regarding risks and benefits of medications and psychotherapy options. Recommend therapy for additional support.     Treatment Plan:    Continue sertraline 100 mg daily     Continue other medications per primary care provider.     Continue all other medications as reviewed per electronic medical record today.     Safety plan reviewed. To the Emergency Department as needed or call after hours crisis line at [phone] or [phone]. [place] Crisis Text Line. Text [place] to 970626 or Suicide LifeLine Chat: suicidepreventionlifeline.org/chat    Schedule an appointment with me in 4 weeks or sooner as needed. Call [facility] at [phone] to schedule.    Follow up with primary care provider as planned or for acute medical concerns.    Call the psychiatric nurse line with medication questions or concerns at [phone].    [employer] may be used to communicate  with your provider, but this is not intended to be used for emergencies.    Administrative Billing:   Time spent with patient was 30 minutes and greater than 50% of time or 20 minutes was spent in counseling and coordination of care regarding above diagnoses and treatment plan.    Patient Status:  Patient will continue to be seen for ongoing consultation and stabilization.    Signed:     Sherice Yeager MD  [facility]    Disclaimer: This note consists of symbols derived from keyboarding, dictation and/or voice recognition software. As a result, there may be errors in the script that have gone undetected. Please consider this when interpreting information found in this chart.

## 2022-01-21 DIAGNOSIS — M10.9 GOUT, UNSPECIFIED CAUSE, UNSPECIFIED CHRONICITY, UNSPECIFIED SITE: ICD-10-CM

## 2022-01-21 DIAGNOSIS — I10 BENIGN ESSENTIAL HYPERTENSION: ICD-10-CM

## 2022-01-21 NOTE — TELEPHONE ENCOUNTER
Routing refill request to provider for review/approval because:  Failed BP protocol    Alissa YEUNG RN  EP Triage

## 2022-01-21 NOTE — TELEPHONE ENCOUNTER
Routing refill request to provider for review/approval because:  Failed protocol    Alissa YEUNG RN  EP Triage

## 2022-01-22 RX ORDER — AMLODIPINE BESYLATE 10 MG/1
TABLET ORAL
Qty: 90 TABLET | Refills: 1 | Status: SHIPPED | OUTPATIENT
Start: 2022-01-22 | End: 2022-05-10

## 2022-01-22 RX ORDER — ALLOPURINOL 100 MG/1
TABLET ORAL
Qty: 90 TABLET | Refills: 1 | Status: SHIPPED | OUTPATIENT
Start: 2022-01-22 | End: 2022-05-10

## 2022-01-22 RX ORDER — LISINOPRIL 20 MG/1
TABLET ORAL
Qty: 90 TABLET | Refills: 1 | Status: SHIPPED | OUTPATIENT
Start: 2022-01-22 | End: 2022-05-10

## 2022-02-09 ENCOUNTER — VIRTUAL VISIT (OUTPATIENT)
Dept: PSYCHIATRY | Facility: CLINIC | Age: 57
End: 2022-02-09
Payer: COMMERCIAL

## 2022-02-09 DIAGNOSIS — F10.21 ALCOHOL USE DISORDER, MODERATE, IN EARLY REMISSION (H): ICD-10-CM

## 2022-02-09 DIAGNOSIS — F32.1 CURRENT MODERATE EPISODE OF MAJOR DEPRESSIVE DISORDER, UNSPECIFIED WHETHER RECURRENT (H): Primary | ICD-10-CM

## 2022-02-09 PROCEDURE — 99214 OFFICE O/P EST MOD 30 MIN: CPT | Mod: 95 | Performed by: STUDENT IN AN ORGANIZED HEALTH CARE EDUCATION/TRAINING PROGRAM

## 2022-02-09 NOTE — PROGRESS NOTES
"Keith is a 56 year old who is being evaluated via a billable video visit.      How would you like to obtain your AVS? MyChart  If the video visit is dropped, the invitation should be resent by: Text to cell phone: [phone]  Will anyone else be joining your video visit? No      Video Start Time: 0800  Video-Visit Details    Type of service:  Video Visit    Video End Time:830    Originating Location (pt. Location): Home    Distant Location (provider location):  [facility]     Platform used for Video Visit: Anup Parker is a 56 year old who is being evaluated via a billable video visit.               Outpatient Psychiatric Progress Note    Name: Keith Medeiros   : 1965                    Primary Care Provider: Lang Hernadez MD   Therapist: none     PHQ-9 scores:  No flowsheet data found.    DAJUAN-7 scores:  No flowsheet data found.    Patient Identification:  Patient is a 56 year old,   White  male  who presents for return visit with me.  Patient is currently employed full time. Patient attended the session alone. Patient prefers to be called: \"Enrique\".    Interim History:  I last saw Keith Medeiros for outpatient psychiatry Return Visit on 22. During that appointment, we made no changes to the sertraline dose.     He reports he is doing \"really good\" overall, daughter came home from deployment in [facility] and was happy to see her. He maintains sobriety and enjoys spending time at home with his wife. Work is better, he feels he is coping well with stress there.    Wonders if it is the right time to increase sertraline, feeling better but not quite 100%. However, given side effects he had when he started it, not sure for now. He does note that he had a change in mood several months ago when the generic prescription changed manufacturers.     Psychiatric ROS:  Keith Medeiros reports mood has been: improved, sleep and appetite improved. No SI  Anxiety has been: " improved, more manageable  Sleep has been: improved, more refreshing  Sydnie sxs: none  Psychosis sxs: none  ADHD/ADD sxs: none  PTSD sxs: none  PHQ9 and GAD7 scores were not done today.   Medication side effects: Yes: orthostatic lightheadedness, no LOC or falls. Also sexual dysfunction that he does not find bothersome at this time  Current stressors include: Caregiving Stress and Occupational Difficulties  Coping mechanisms and supports include: Family, Hobbies and Friends    Current medications include:   Current Outpatient Medications   Medication Sig     allopurinol (ZYLOPRIM) 100 MG tablet TAKE 1 TABLET(100 MG) BY MOUTH DAILY     amLODIPine (NORVASC) 10 MG tablet TAKE 1 TABLET(10 MG) BY MOUTH DAILY     lisinopril (ZESTRIL) 20 MG tablet TAKE 1 TABLET(20 MG) BY MOUTH DAILY     sertraline (ZOLOFT) 100 MG tablet Take 1 tablet (100 mg) by mouth daily     No current facility-administered medications for this visit.       The [place] Prescription Monitoring Program has been reviewed. Indicates no controlled prescriptions in the past 12 months.    Previous medication trials include but not limited to:  Venlafaxine, quetiapine    Past Medical/Surgical History:  No past medical history on file.   has no past medical history on file.    Social History:  Current Living situation:  [place] , [place] with Spouse/Partner and Daughter.  Current use of drugs or alcohol: no alcohol in over one month   Tobacco use: No    Vital Signs:   There were no vitals taken for this visit.    Labs:  Most recent laboratory results reviewed and no new labs.     Review of Systems:  10 systems (general, cardiovascular, respiratory, eyes, ENT, endocrine, GI, , M/S, neurological) were reviewed. Most pertinent finding(s) is/are: orthostatic lightheadedness . The remaining systems are all unremarkable.    Mental Status Examination:  Appearance: awake, alert, adequately groomed, appeared stated age and no apparent distress  Attitude:  cooperative  "  Eye Contact:  good  Gait and Station: normal, no gross abnormalities noted by observation  Psychomotor Behavior:  no evidence of tardive dyskinesia, dystonia, or tics  Oriented to:  person, place, time, and situation  Attention Span and Concentration:  normal  Speech:  clear, coherent, regular rate, rhythm, and volume  Language: intact  Mood:  \"better\"  Affect:  appropriate and in normal range, mood congruent and restricted  Associations:  no loose associations  Thought Process:  logical, linear and goal oriented  Thought Content:  no evidence of suicidal ideation or homicidal ideation, no evidence of psychotic thought, no auditory hallucinations present and no visual hallucinations present  Recent and Remote Memory:  Intact to interview. Not formally assessed. No amnesia.  Fund of Knowledge: appropriate  Insight:  good  Judgment:  intact, adequate for safety  Impulse Control:  intact    Suicide Risk Assessment:  Today Keith Medeiros reports no SI. In addition, there are notable risk factors for self-harm, including age and anxiety. However, risk is mitigated by commitment to family, sobriety, absence of past attempts, ability to volunteer a safety plan, history of seeking help when needed, future oriented, no access to firearms or weapons, identifies reasons to live including family, denies suicidal intent or plan, no family history of suicide and denies homicidal ideation, intent, or plan. Therefore, based on all available evidence including the factors cited above, Keith Medeiros does not appear to be at imminent risk for self-harm, does not meet criteria for a 72-hr hold, and therefore remains appropriate for ongoing outpatient level of care.  A thorough assessment of risk factors related to suicide and self-harm have been reviewed and are noted above. The patient convincingly denies suicidality on several occasions. Local community safety resources printed and reviewed for patient to use if needed. There was no " deceit detected, and the patient presented in a manner that was believable.     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    Psychosocial & Contextual Factors:  Occupational Difficulties and caregiving stress    Assessment:  Keith SHYANN Medeiros reports overall interim improvement in depression and anxiety symptoms, likely related to abstinence from alcohol, but also possibly attributable to sertraline titration. Sleep and baseline anxiety in particular are better and likely to continue to improve with continued sobriety. Discussed possibility of further sertraline titration. He would prefer to wait another month before making this decision.      Medication side effects and alternatives were reviewed. Health promotion activities recommended and reviewed today. All questions addressed. Education and counseling completed regarding risks and benefits of medications and psychotherapy options. Recommend therapy for additional support.     Treatment Plan:    Continue sertraline 100 mg daily     Continue other medications per primary care provider.     Continue all other medications as reviewed per electronic medical record today.     Safety plan reviewed. To the Emergency Department as needed or call after hours crisis line at [phone] or [phone]. [place] Crisis Text Line. Text [place] to 815984 or Suicide LifeLine Chat: suicidepreventionlifeline.org/chat    Schedule an appointment with me in 4 weeks or sooner as needed. Call [facility] at [phone] to schedule.    Follow up with primary care provider as planned or for acute medical concerns.    Call the psychiatric nurse line with medication questions or concerns at  [phone].    Calxedahart may be used to communicate with your provider, but this is not intended to be used for emergencies.    Administrative Billing:   Time spent with patient was 30 minutes and greater than 50% of time or 20 minutes was spent in counseling and coordination of care regarding above diagnoses and treatment plan.    Patient Status:  Patient will continue to be seen for ongoing consultation and stabilization.    Signed:     Sherice Yeager MD  [facility]    Disclaimer: This note consists of symbols derived from keyboarding, dictation and/or voice recognition software. As a result, there may be errors in the script that have gone undetected. Please consider this when interpreting information found in this chart.

## 2022-03-09 ENCOUNTER — VIRTUAL VISIT (OUTPATIENT)
Dept: PSYCHIATRY | Facility: CLINIC | Age: 57
End: 2022-03-09
Payer: COMMERCIAL

## 2022-03-09 DIAGNOSIS — F32.1 CURRENT MODERATE EPISODE OF MAJOR DEPRESSIVE DISORDER, UNSPECIFIED WHETHER RECURRENT (H): ICD-10-CM

## 2022-03-09 PROCEDURE — 99214 OFFICE O/P EST MOD 30 MIN: CPT | Mod: 95 | Performed by: STUDENT IN AN ORGANIZED HEALTH CARE EDUCATION/TRAINING PROGRAM

## 2022-03-09 RX ORDER — SERTRALINE HYDROCHLORIDE 100 MG/1
150 TABLET, FILM COATED ORAL DAILY
Qty: 45 TABLET | Refills: 1 | Status: SHIPPED | OUTPATIENT
Start: 2022-03-09 | End: 2022-05-10

## 2022-03-09 ASSESSMENT — ANXIETY QUESTIONNAIRES
7. FEELING AFRAID AS IF SOMETHING AWFUL MIGHT HAPPEN: NOT AT ALL
6. BECOMING EASILY ANNOYED OR IRRITABLE: NOT AT ALL
4. TROUBLE RELAXING: NOT AT ALL
7. FEELING AFRAID AS IF SOMETHING AWFUL MIGHT HAPPEN: NOT AT ALL
3. WORRYING TOO MUCH ABOUT DIFFERENT THINGS: NOT AT ALL
GAD7 TOTAL SCORE: 0
5. BEING SO RESTLESS THAT IT IS HARD TO SIT STILL: NOT AT ALL
GAD7 TOTAL SCORE: 0
2. NOT BEING ABLE TO STOP OR CONTROL WORRYING: NOT AT ALL
GAD7 TOTAL SCORE: 0
1. FEELING NERVOUS, ANXIOUS, OR ON EDGE: NOT AT ALL

## 2022-03-09 ASSESSMENT — PATIENT HEALTH QUESTIONNAIRE - PHQ9
SUM OF ALL RESPONSES TO PHQ QUESTIONS 1-9: 0
SUM OF ALL RESPONSES TO PHQ QUESTIONS 1-9: 0
10. IF YOU CHECKED OFF ANY PROBLEMS, HOW DIFFICULT HAVE THESE PROBLEMS MADE IT FOR YOU TO DO YOUR WORK, TAKE CARE OF THINGS AT HOME, OR GET ALONG WITH OTHER PEOPLE: NOT DIFFICULT AT ALL

## 2022-03-09 NOTE — PROGRESS NOTES
"Keith is a 56 year old who is being evaluated via a billable video visit.      How would you like to obtain your AVS? Mail a copy  If the video visit is dropped, the invitation should be resent by: Text to cell phone: [phone]  Will anyone else be joining your video visit? No      Video Start Time: 8:36 AM  Video-Visit Details    Type of service:  Video Visit    Video End Time:852    Originating Location (pt. Location): Home    Distant Location (provider location):  [facility]     [facility]     Keith is a 56 year old who is being evaluated via a billable video visit.            Outpatient Psychiatric Progress Note    Name: Keith Medeiros   : 1965                    Primary Care Provider: Lang Hernadez MD   Therapist: none     PHQ-9 scores:  PHQ-9 SCORE 3/9/2022   PHQ-9 Total Score MyChart 0   PHQ-9 Total Score 0       DAJUAN-7 scores:  DAJUAN-7 SCORE 3/9/2022   Total Score 0 (minimal anxiety)   Total Score 0       Patient Identification:  Patient is a 56 year old,   White  male  who presents for return visit with me.  Patient is currently employed full time. Patient attended the session alone. Patient prefers to be called: \"Enrique\".    Interim History:  I last saw Keith Medeiros for outpatient psychiatry Return Visit on 22. During that appointment, we made no changes to the sertraline dose.     He reports he again had a change in the generic pill , but did not have any change in his symptoms or side effects. No concerns about this. Was great to have his daughter home from [facility] on deployment. Not feeling irritable anymore.    He feels like he still has trouble concentrating and motivating himself, difficulty sitting down to read and reading for more than 15-20 minutes. Used to be able to sit and read for 2 hours+ at a time. Does not recall when this was that he was able to do this though. Overall feeling 75% better from " before starting the sertraline (of note that was shortly after getting sober from alcohol again).    He has noticed that he is having more frequent urination at night since starting sertraline. Read online that this could be an interaction between BP meds and antidepressants. Taking his BP on a regular basis and it is never ?>120/80.         Psychiatric ROS:  Keith BOOTHE Waldemar reports mood has been: improved, sleep and appetite improved. No SI  Anxiety has been: improved, more manageable  Sleep has been: improved, more refreshing  Sydnie sxs: none  Psychosis sxs: none  ADHD/ADD sxs: none  PTSD sxs: none  PHQ9 and GAD7 scores were not done today.   Medication side effects: Yes: orthostatic lightheadedness, no LOC or falls. Also sexual dysfunction that he does not find bothersome at this time  Current stressors include: Caregiving Stress and Occupational Difficulties  Coping mechanisms and supports include: Family, Hobbies and Friends    Current medications include:   Current Outpatient Medications   Medication Sig     allopurinol (ZYLOPRIM) 100 MG tablet TAKE 1 TABLET(100 MG) BY MOUTH DAILY     amLODIPine (NORVASC) 10 MG tablet TAKE 1 TABLET(10 MG) BY MOUTH DAILY     lisinopril (ZESTRIL) 20 MG tablet TAKE 1 TABLET(20 MG) BY MOUTH DAILY     sertraline (ZOLOFT) 100 MG tablet Take 1 tablet (100 mg) by mouth daily     No current facility-administered medications for this visit.       The [place] Prescription Monitoring Program has been reviewed. Indicates no controlled prescriptions in the past 12 months.    Previous medication trials include but not limited to:  Venlafaxine, quetiapine    Past Medical/Surgical History:  No past medical history on file.   has no past medical history on file.    Social History:  Current Living situation:  [place] , [place] with Spouse/Partner and Daughter.  Current use of drugs or alcohol: no alcohol in over one month   Tobacco use: No    Vital Signs:   There were no vitals taken for this  "visit.    Labs:  Most recent laboratory results reviewed and no new labs.     Review of Systems:  10 systems (general, cardiovascular, respiratory, eyes, ENT, endocrine, GI, , M/S, neurological) were reviewed. Most pertinent finding(s) is/are: orthostatic lightheadedness . The remaining systems are all unremarkable.    Mental Status Examination:  Appearance: awake, alert, adequately groomed, appeared stated age and no apparent distress  Attitude:  cooperative   Eye Contact:  good  Gait and Station: normal, no gross abnormalities noted by observation  Psychomotor Behavior:  no evidence of tardive dyskinesia, dystonia, or tics  Oriented to:  person, place, time, and situation  Attention Span and Concentration:  normal  Speech:  clear, coherent, regular rate, rhythm, and volume  Language: intact  Mood:  \"better\"  Affect:  appropriate and in normal range, mood congruent and restricted  Associations:  no loose associations  Thought Process:  logical, linear and goal oriented  Thought Content:  no evidence of suicidal ideation or homicidal ideation, no evidence of psychotic thought, no auditory hallucinations present and no visual hallucinations present  Recent and Remote Memory:  Intact to interview. Not formally assessed. No amnesia.  Fund of Knowledge: appropriate  Insight:  good  Judgment:  intact, adequate for safety  Impulse Control:  intact    Suicide Risk Assessment:  Today Keith Medeiros reports no SI. In addition, there are notable risk factors for self-harm, including age and anxiety. However, risk is mitigated by commitment to family, sobriety, absence of past attempts, ability to volunteer a safety plan, history of seeking help when needed, future oriented, no access to firearms or weapons, identifies reasons to live including family, denies suicidal intent or plan, no family history of suicide and denies homicidal ideation, intent, or plan. Therefore, based on all available evidence including the factors " cited above, Keith Medeiros does not appear to be at imminent risk for self-harm, does not meet criteria for a 72-hr hold, and therefore remains appropriate for ongoing outpatient level of care.  A thorough assessment of risk factors related to suicide and self-harm have been reviewed and are noted above. The patient convincingly denies suicidality on several occasions. Local community safety resources printed and reviewed for patient to use if needed. There was no deceit detected, and the patient presented in a manner that was believable.     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    Psychosocial & Contextual Factors:  Occupational Difficulties and caregiving stress    Assessment:  Keith Medeiros reports overall interim improvement in depression and anxiety symptoms, likely related to abstinence from alcohol, but also possibly attributable to sertraline titration. Sleep and baseline anxiety in particular are better and likely to continue to improve with continued sobriety. Continues to experience difficulty with motivation and concentration, interested in further titration to target these symptoms. Discussed possible benefits, possible worsening of side effects (sexual dysfunction) and possibility of no change. Recommended patient follow up with PCP re: frequent nighttime urination; per chart review his PCP did recommend follow up in 6 months at their last visit.    Medication side effects and alternatives were reviewed. Health promotion activities recommended and reviewed today. All questions addressed. Education and counseling completed regarding risks and benefits of medications and psychotherapy options. Recommend therapy for  additional support.     Treatment Plan:    Increase sertraline to 150 mg daily     Continue other medications per primary care provider.     Continue all other medications as reviewed per electronic medical record today.     Safety plan reviewed. To the Emergency Department as needed or call after hours crisis line at [phone] or [phone]. [place] Crisis Text Line. Text [place] to 697863 or Suicide LifeLine Chat: suicidepreventionAsanaline.org/chat    Schedule an appointment with me in 4 weeks or sooner as needed. Call [facility] at [phone] to schedule.    Follow up with primary care provider as planned or for acute medical concerns.    Call the psychiatric nurse line with medication questions or concerns at [phone].    iStorez may be used to communicate with your provider, but this is not intended to be used for emergencies.      Patient Status:  Patient will continue to be seen for ongoing consultation and stabilization.    Signed:     Sherice Yeager MD  [facility]    Disclaimer: This note consists of symbols derived from keyboarding, dictation and/or voice recognition software. As a result, there may be errors in the script that have gone undetected. Please consider this when interpreting information found in this chart.

## 2022-03-10 ASSESSMENT — ANXIETY QUESTIONNAIRES: GAD7 TOTAL SCORE: 0

## 2022-03-10 ASSESSMENT — PATIENT HEALTH QUESTIONNAIRE - PHQ9: SUM OF ALL RESPONSES TO PHQ QUESTIONS 1-9: 0

## 2022-05-10 ENCOUNTER — VIRTUAL VISIT (OUTPATIENT)
Dept: FAMILY MEDICINE | Facility: CLINIC | Age: 57
End: 2022-05-10
Payer: COMMERCIAL

## 2022-05-10 DIAGNOSIS — I10 BENIGN ESSENTIAL HYPERTENSION: ICD-10-CM

## 2022-05-10 DIAGNOSIS — M10.9 GOUT, UNSPECIFIED CAUSE, UNSPECIFIED CHRONICITY, UNSPECIFIED SITE: ICD-10-CM

## 2022-05-10 PROCEDURE — 99214 OFFICE O/P EST MOD 30 MIN: CPT | Mod: 95 | Performed by: FAMILY MEDICINE

## 2022-05-10 RX ORDER — ALLOPURINOL 100 MG/1
100 TABLET ORAL DAILY
Qty: 90 TABLET | Refills: 0 | Status: SHIPPED | OUTPATIENT
Start: 2022-05-10 | End: 2022-08-15

## 2022-05-10 RX ORDER — AMLODIPINE BESYLATE 10 MG/1
10 TABLET ORAL DAILY
Qty: 90 TABLET | Refills: 0 | Status: SHIPPED | OUTPATIENT
Start: 2022-05-10 | End: 2022-08-15

## 2022-05-10 RX ORDER — LISINOPRIL 20 MG/1
20 TABLET ORAL DAILY
Qty: 90 TABLET | Refills: 0 | Status: SHIPPED | OUTPATIENT
Start: 2022-05-10 | End: 2022-08-15

## 2022-05-10 NOTE — PROGRESS NOTES
Enrique is a 57 year old who is being evaluated via a billable video visit.      How would you like to obtain your AVS? Mail a copy  If the video visit is dropped, the invitation should be resent by: Text to cell phone: [phone]  Will anyone else be joining your video visit? No      Video Start Time: 5:48 PM    Assessment & Plan       (I10) Benign essential hypertension  Comment:   Plan: lisinopril (ZESTRIL) 20 MG tablet, amLODIPine         (NORVASC) 10 MG tablet              (M10.9) Gout, unspecified cause, unspecified chronicity, unspecified site  Comment:   Plan: allopurinol (ZYLOPRIM) 100 MG tablet              Refill sent.Cares and  treatment discussed.  Encouraged follow up check in person for BP check/ nurse visit , also due for routine physical soon , so she will schdule that as well  But should be seen sooner  if problem   Patient expressed understanding and agreement with treatment plan. All patient's questions were answered, will let me know if has more later.  Medications: Rx's: Reviewed the potential side effects/complications of medications prescribed.     BMI:   Estimated body mass index is 30.24 kg/m  as calculated from the following:    Height as of 8/2/21: 1.829 m (6').    Weight as of 8/2/21: 101.2 kg (223 lb).   Weight management plan: Discussed healthy diet and exercise guidelines        Return in about 3 months (around 8/10/2022) for Physical Exam, but return sooner if problem.    Adela Lares MD  [facility]    Bulmaro Nunez is a 57 year old who presents for the following health issues     HPI     Due for follow up and need refill on med's    Hypertension Follow-up      Do you check your blood pressure regularly outside of the clinic? Yes . Last reading in chart is  high , although doing better at home . Previous Labs were also good. She is feeling well otherwise      Are you following a low salt diet? No    Are your blood pressures ever more than 140 on  the top number (systolic) OR more   than 90 on the bottom number (diastolic), for example 140/90? No      How many servings of fruits and vegetables do you eat daily?  0-1    On average, how many sweetened beverages do you drink each day (Examples: soda, juice, sweet tea, etc.  Do NOT count diet or artificially sweetened beverages)?   0-1    How many days per week do you exercise enough to make your heart beat faster? 3 or less    How many minutes a day do you exercise enough to make your heart beat faster? 9 or less    How many days per week do you miss taking your medication? 0      Gout  Follow-up    Also due for gout follow up.  labs are current and doing well on current med's.           Review of Systems   Constitutional, HEENT, cardiovascular, pulmonary, GI, , musculoskeletal, neuro, skin, endocrine and psych systems are negative, except as otherwise noted.      Objective           Vitals:  No vitals were obtained today due to virtual visit.    Physical Exam   GENERAL: Healthy, alert and no distress  EYES: Eyes grossly normal to inspection.  No discharge or erythema, or obvious scleral/conjunctival abnormalities.  RESP: No audible wheeze, cough, or visible cyanosis.  No visible retractions or increased work of breathing.    SKIN: Visible skin clear. No significant rash, abnormal pigmentation or lesions.  NEURO: Cranial nerves grossly intact.  Mentation and speech appropriate for age.  PSYCH: Mentation appears normal, affect normal/bright, judgement and insight intact, normal speech and appearance well-groomed.      Video-Visit Details    Type of service:  Video Visit    Video End Time:6:05 PM    Originating Location (pt. Location): Home    Distant Location (provider location):  [facility]     Platform used for Video Visit: [employer]

## 2022-10-16 DIAGNOSIS — I10 BENIGN ESSENTIAL HYPERTENSION: ICD-10-CM

## 2022-10-19 RX ORDER — LISINOPRIL 20 MG/1
TABLET ORAL
Qty: 20 TABLET | Refills: 1 | Status: SHIPPED | OUTPATIENT
Start: 2022-10-19 | End: 2022-10-19

## 2022-10-19 RX ORDER — LISINOPRIL 20 MG/1
20 TABLET ORAL DAILY
Qty: 30 TABLET | Refills: 1 | Status: SHIPPED | OUTPATIENT
Start: 2022-10-19 | End: 2022-12-05

## 2022-10-19 NOTE — TELEPHONE ENCOUNTER
Routing refill request to provider for review/approval because:  Failed protocol due to:   BP Readings from Last 3 Encounters:   08/02/21 (!) 184/84   05/31/07 133/84     Creatinine   Date Value Ref Range Status   08/02/2021 1.01 0.66 - 1.25 mg/dL Final     Potassium   Date Value Ref Range Status   08/02/2021 3.7 3.4 - 5.3 mmol/L Final     Patient is scheduled for an appt 12/5/22.    Radha Horn RN

## 2022-10-19 NOTE — TELEPHONE ENCOUNTER
Due for med check/CPE and fasting lab, limited supplies sent, please help him to schedule       thx

## 2022-11-12 DIAGNOSIS — I10 BENIGN ESSENTIAL HYPERTENSION: ICD-10-CM

## 2022-11-14 RX ORDER — AMLODIPINE BESYLATE 10 MG/1
TABLET ORAL
Qty: 90 TABLET | Refills: 3 | Status: SHIPPED | OUTPATIENT
Start: 2022-11-14 | End: 2022-12-05

## 2022-11-14 NOTE — TELEPHONE ENCOUNTER
Routing refill request to provider for review/approval because:  Labs not current:    Creatinine   Date Value Ref Range Status   08/02/2021 1.01 0.66 - 1.25 mg/dL Final     BP Readings from Last 3 Encounters:   08/02/21 (!) 184/84   05/31/07 133/84     Radha GRIFFITHS RN  [facility]

## 2022-12-04 ENCOUNTER — HEALTH MAINTENANCE LETTER (OUTPATIENT)
Age: 57
End: 2022-12-04

## 2022-12-05 ENCOUNTER — VIRTUAL VISIT (OUTPATIENT)
Dept: FAMILY MEDICINE | Facility: CLINIC | Age: 57
End: 2022-12-05
Payer: COMMERCIAL

## 2022-12-05 DIAGNOSIS — Z12.11 SCREEN FOR COLON CANCER: ICD-10-CM

## 2022-12-05 DIAGNOSIS — M10.9 GOUT, UNSPECIFIED CAUSE, UNSPECIFIED CHRONICITY, UNSPECIFIED SITE: Primary | ICD-10-CM

## 2022-12-05 DIAGNOSIS — I10 BENIGN ESSENTIAL HYPERTENSION: ICD-10-CM

## 2022-12-05 DIAGNOSIS — Z12.5 SCREENING FOR PROSTATE CANCER: ICD-10-CM

## 2022-12-05 PROCEDURE — 99214 OFFICE O/P EST MOD 30 MIN: CPT | Mod: 95 | Performed by: FAMILY MEDICINE

## 2022-12-05 RX ORDER — LISINOPRIL 20 MG/1
20 TABLET ORAL DAILY
Qty: 90 TABLET | Refills: 3 | Status: SHIPPED | OUTPATIENT
Start: 2022-12-05 | End: 2023-12-29

## 2022-12-05 RX ORDER — ALLOPURINOL 100 MG/1
100 TABLET ORAL DAILY
Qty: 90 TABLET | Refills: 3 | Status: SHIPPED | OUTPATIENT
Start: 2022-12-05 | End: 2023-01-16

## 2022-12-05 RX ORDER — AMLODIPINE BESYLATE 10 MG/1
10 TABLET ORAL DAILY
Qty: 90 TABLET | Refills: 3 | Status: SHIPPED | OUTPATIENT
Start: 2022-12-05 | End: 2024-01-29

## 2022-12-05 NOTE — PROGRESS NOTES
Enrique is a 57 year old who is being evaluated via a billable video visit.      How would you like to obtain your AVS? MyChart  If the video visit is dropped, the invitation should be resent by: Other e-mail: [phone]   Will anyone else be joining your video visit? No          Assessment & Plan     Benign essential hypertension  Stable, keep monitoring   Will review the lab results and update pt   - CBC with platelets; Future  - Comprehensive metabolic panel (BMP + Alb, Alk Phos, ALT, AST, Total. Bili, TP); Future  - Lipid panel reflex to direct LDL Fasting; Future  - lisinopril (ZESTRIL) 20 MG tablet; Take 1 tablet (20 mg) by mouth daily  - amLODIPine (NORVASC) 10 MG tablet; Take 1 tablet (10 mg) by mouth daily    Screen for colon cancer      Gout, unspecified cause, unspecified chronicity, unspecified site  Stable, has no flares, will keep monitoring   Will review the lab results and update pt   - CBC with platelets; Future  - Comprehensive metabolic panel (BMP + Alb, Alk Phos, ALT, AST, Total. Bili, TP); Future  - Uric acid; Future  - allopurinol (ZYLOPRIM) 100 MG tablet; Take 1 tablet (100 mg) by mouth daily    Screening for prostate cancer    - PSA, screen; Future           FUTURE APPOINTMENTS:       - Follow-up visit in 1 year for CPE and med check     No follow-ups on file.    Lang Hernadez MD  [facility] [place] N[place] is a 57 year old presenting for the following health issues:  Medication Follow-up      HPI     Hypertension Follow-up      Do you check your blood pressure regularly outside of the clinic? No     Are you following a low salt diet? Yes    Are your blood pressures ever more than 140 on the top number (systolic) OR more   than 90 on the bottom number (diastolic), for example 140/90? No      How many servings of fruits and vegetables do you eat daily?  4 or more    On average, how many sweetened beverages do you drink each day (Examples: soda, juice, sweet tea,  etc.  Do NOT count diet or artificially sweetened beverages)?   0    How many days per week do you exercise enough to make your heart beat faster? 3 or less    How many minutes a day do you exercise enough to make your heart beat faster? 10 - 19    How many days per week do you miss taking your medication? 0        Review of Systems   Constitutional, HEENT, cardiovascular, pulmonary, gi and gu systems are negative, except as otherwise noted.      Objective           Vitals:  No vitals were obtained today due to virtual visit.    Physical Exam   GENERAL: Healthy, alert and no distress  EYES: Eyes grossly normal to inspection.  No discharge or erythema, or obvious scleral/conjunctival abnormalities.  RESP: No audible wheeze, cough, or visible cyanosis.  No visible retractions or increased work of breathing.    SKIN: Visible skin clear. No significant rash, abnormal pigmentation or lesions.  NEURO: Cranial nerves grossly intact.  Mentation and speech appropriate for age.  PSYCH: Mentation appears normal, affect normal/bright, judgement and insight intact, normal speech and appearance well-groomed.          Telephone call time: 26 minutes   Answers for HPI/ROS submitted by the patient on 12/5/2022  If you checked off any problems, how difficult have these problems made it for you to do your work, take care of things at home, or get along with other people?: Not difficult at all  PHQ9 TOTAL SCORE: 0

## 2023-01-15 DIAGNOSIS — M10.9 GOUT, UNSPECIFIED CAUSE, UNSPECIFIED CHRONICITY, UNSPECIFIED SITE: ICD-10-CM

## 2023-01-16 RX ORDER — ALLOPURINOL 100 MG/1
TABLET ORAL
Qty: 90 TABLET | Refills: 3 | Status: SHIPPED | OUTPATIENT
Start: 2023-01-16 | End: 2023-12-26

## 2023-01-18 ENCOUNTER — TELEPHONE (OUTPATIENT)
Dept: FAMILY MEDICINE | Facility: CLINIC | Age: 58
End: 2023-01-18
Payer: COMMERCIAL

## 2023-01-18 NOTE — TELEPHONE ENCOUNTER
Reason for Call:  Form, our goal is to have forms completed with 72 hours, however, some forms may require a visit or additional information.    Type of letter, form or note:  FMLA    Who is the form from?: Patient    Where did the form come from: Patient or family brought in       What clinic location was the form placed at?: [facility]    Where the form was placed: [address]    What number is listed as a contact on the form?: [phone]       Additional comments: Pt. Has video call on 1/23 w/ Dr. Hernadez regarding FMLA paperwork    Call taken on 1/18/2023 at 4:33 PM by Lindy Khan

## 2023-01-19 NOTE — TELEPHONE ENCOUNTER
Pt calling, would like form faxed to [phone] Atten: Juli Mayers/Janneth-  [facility]

## 2023-01-23 ENCOUNTER — VIRTUAL VISIT (OUTPATIENT)
Dept: FAMILY MEDICINE | Facility: CLINIC | Age: 58
End: 2023-01-23
Payer: COMMERCIAL

## 2023-01-23 DIAGNOSIS — F32.1 CURRENT MODERATE EPISODE OF MAJOR DEPRESSIVE DISORDER, UNSPECIFIED WHETHER RECURRENT (H): ICD-10-CM

## 2023-01-23 DIAGNOSIS — Z63.6 CAREGIVER BURDEN: Primary | ICD-10-CM

## 2023-01-23 PROCEDURE — 99214 OFFICE O/P EST MOD 30 MIN: CPT | Mod: 95 | Performed by: FAMILY MEDICINE

## 2023-01-23 SDOH — SOCIAL STABILITY - SOCIAL INSECURITY: DEPENDENT RELATIVE NEEDING CARE AT HOME: Z63.6

## 2023-01-23 NOTE — PROGRESS NOTES
Enrique is a 57 year old who is being evaluated via a billable video visit.      How would you like to obtain your AVS? MyChart  If the video visit is dropped, the invitation should be resent by: Text to cell phone: [phone]  Will anyone else be joining your video visit? No          Assessment & Plan     Caregiver burden  His spouse has permament debilitating neurologic condition, needs 24 hours supervision,   Currently on PT/SLP and neurologist visit frequently  Will have him to file FMLA for the condition, pt will figure out his housing and disposition within next 12 weeks, and decide to the next step I employing status            FUTURE APPOINTMENTS:       - Follow-up visit in 7 months for CPE    No follow-ups on file.    Lang Hernadez MD  [facility]    Bulmaro Nunez is a 57 year old presenting for the following health issues:  No chief complaint on file.      HPI     Patient is here regarding: FMLA forms for caring for wife    Review of Systems   Constitutional, HEENT, cardiovascular, pulmonary, gi and gu systems are negative, except as otherwise noted.      Objective           Vitals:  No vitals were obtained today due to virtual visit.    Physical Exam   GENERAL: Healthy, alert and no distress  EYES: Eyes grossly normal to inspection.  No discharge or erythema, or obvious scleral/conjunctival abnormalities.  RESP: No audible wheeze, cough, or visible cyanosis.  No visible retractions or increased work of breathing.    SKIN: Visible skin clear. No significant rash, abnormal pigmentation or lesions.  NEURO: Cranial nerves grossly intact.  Mentation and speech appropriate for age.  PSYCH: Mentation appears normal, affect normal/bright, judgement and insight intact, normal speech and appearance well-groomed.                Video-Visit Details    Type of service:  Video Visit     Originating Location (pt. Location): Home    Distant Location (provider location):  On-site  Platform used  for Video Visit: Anup     Video starting: 10:00am   Video finishing: 10:33am

## 2023-01-23 NOTE — TELEPHONE ENCOUNTER
Picked up completed paperwork, faxed to [phone], mailed a copy of paperwork to patient for their records, and sent to abstraction.    Andree THOMAS    [facility]

## 2023-02-07 ENCOUNTER — LAB (OUTPATIENT)
Dept: LAB | Facility: CLINIC | Age: 58
End: 2023-02-07
Payer: COMMERCIAL

## 2023-02-07 DIAGNOSIS — M10.9 GOUT, UNSPECIFIED CAUSE, UNSPECIFIED CHRONICITY, UNSPECIFIED SITE: ICD-10-CM

## 2023-02-07 DIAGNOSIS — I10 BENIGN ESSENTIAL HYPERTENSION: ICD-10-CM

## 2023-02-07 DIAGNOSIS — Z12.5 SCREENING FOR PROSTATE CANCER: ICD-10-CM

## 2023-02-07 LAB
ALBUMIN SERPL BCG-MCNC: 4.4 G/DL (ref 3.5–5.2)
ALP SERPL-CCNC: 63 U/L (ref 40–129)
ALT SERPL W P-5'-P-CCNC: 38 U/L (ref 10–50)
ANION GAP SERPL CALCULATED.3IONS-SCNC: 13 MMOL/L (ref 7–15)
AST SERPL W P-5'-P-CCNC: 37 U/L (ref 10–50)
BILIRUB SERPL-MCNC: 0.3 MG/DL
BUN SERPL-MCNC: 20.2 MG/DL (ref 6–20)
CALCIUM SERPL-MCNC: 9.4 MG/DL (ref 8.6–10)
CHLORIDE SERPL-SCNC: 106 MMOL/L (ref 98–107)
CHOLEST SERPL-MCNC: 204 MG/DL
CREAT SERPL-MCNC: 1.09 MG/DL (ref 0.67–1.17)
DEPRECATED HCO3 PLAS-SCNC: 20 MMOL/L (ref 22–29)
ERYTHROCYTE [DISTWIDTH] IN BLOOD BY AUTOMATED COUNT: 13 % (ref 10–15)
GFR SERPL CREATININE-BSD FRML MDRD: 79 ML/MIN/1.73M2
GLUCOSE SERPL-MCNC: 108 MG/DL (ref 70–99)
HCT VFR BLD AUTO: 40.1 % (ref 40–53)
HDLC SERPL-MCNC: 31 MG/DL
HGB BLD-MCNC: 13.4 G/DL (ref 13.3–17.7)
LDLC SERPL CALC-MCNC: 126 MG/DL
MCH RBC QN AUTO: 30 PG (ref 26.5–33)
MCHC RBC AUTO-ENTMCNC: 33.4 G/DL (ref 31.5–36.5)
MCV RBC AUTO: 90 FL (ref 78–100)
NONHDLC SERPL-MCNC: 173 MG/DL
PLATELET # BLD AUTO: 140 10E3/UL (ref 150–450)
POTASSIUM SERPL-SCNC: 3.9 MMOL/L (ref 3.4–5.3)
PROT SERPL-MCNC: 7.4 G/DL (ref 6.4–8.3)
PSA SERPL-MCNC: 1.82 NG/ML (ref 0–3.5)
RBC # BLD AUTO: 4.47 10E6/UL (ref 4.4–5.9)
SODIUM SERPL-SCNC: 139 MMOL/L (ref 136–145)
TRIGL SERPL-MCNC: 235 MG/DL
URATE SERPL-MCNC: 6.8 MG/DL (ref 3.4–7)
WBC # BLD AUTO: 6 10E3/UL (ref 4–11)

## 2023-02-07 PROCEDURE — 36415 COLL VENOUS BLD VENIPUNCTURE: CPT

## 2023-02-07 PROCEDURE — 80053 COMPREHEN METABOLIC PANEL: CPT

## 2023-02-07 PROCEDURE — 84550 ASSAY OF BLOOD/URIC ACID: CPT

## 2023-02-07 PROCEDURE — 80061 LIPID PANEL: CPT

## 2023-02-07 PROCEDURE — 85027 COMPLETE CBC AUTOMATED: CPT

## 2023-02-07 PROCEDURE — G0103 PSA SCREENING: HCPCS

## 2023-07-31 DIAGNOSIS — Z63.6 CAREGIVER BURDEN: Primary | ICD-10-CM

## 2023-07-31 RX ORDER — NALTREXONE HYDROCHLORIDE 50 MG/1
50 TABLET, FILM COATED ORAL DAILY
Status: CANCELLED | OUTPATIENT
Start: 2023-07-31

## 2023-07-31 RX ORDER — ESCITALOPRAM OXALATE 10 MG/1
10 TABLET ORAL DAILY
Qty: 15 TABLET | Refills: 0 | Status: SHIPPED | OUTPATIENT
Start: 2023-07-31 | End: 2024-02-08

## 2023-07-31 SDOH — SOCIAL STABILITY - SOCIAL INSECURITY: DEPENDENT RELATIVE NEEDING CARE AT HOME: Z63.6

## 2023-07-31 NOTE — TELEPHONE ENCOUNTER
Medication Question or Refill    Contacts         Type Contact Phone/Fax    07/31/2023 02:55 PM CDT Phone (Incoming) Enrique Medeiros (Self) [phone] (H)            What medication are you calling about (include dose and sig)?: Naltrexone 50mg daily & escitalopram 10mg    Preferred Pharmacy:     [id number] - [place], [place] - [address]  AT [facility] [address] [facility]  [address] [place] [address]  Phone: [phone] Fax: [phone]      Controlled Substance Agreement on file:   CSA -- Patient Level:    CSA: None found at the patient level.       Who prescribed the medication?: Dr. Gill    Do you need a refill? Yes    When did you use the medication last? Daily    Patient offered an appointment? Yes: Scheduled for 8/9, video visit    Do you have any questions or concerns?  Yes: Going to run out of meds BEFORE visit, please offer parminder refill for interim only--Patient wants to discuss changing the dosage during his visit.       Could we send this information to you in [employer] or would you prefer to receive a phone call?:   Patient would prefer a phone call   Okay to leave a detailed message?: Yes at Home number on file [phone] (home)

## 2023-08-09 ENCOUNTER — VIRTUAL VISIT (OUTPATIENT)
Dept: FAMILY MEDICINE | Facility: CLINIC | Age: 58
End: 2023-08-09
Payer: COMMERCIAL

## 2023-08-09 DIAGNOSIS — F10.21 ALCOHOL DEPENDENCE IN REMISSION (H): ICD-10-CM

## 2023-08-09 DIAGNOSIS — Z63.6 CAREGIVER BURDEN: Primary | ICD-10-CM

## 2023-08-09 DIAGNOSIS — M25.539 PAIN IN WRIST, UNSPECIFIED LATERALITY: ICD-10-CM

## 2023-08-09 DIAGNOSIS — F32.1 CURRENT MODERATE EPISODE OF MAJOR DEPRESSIVE DISORDER, UNSPECIFIED WHETHER RECURRENT (H): ICD-10-CM

## 2023-08-09 PROCEDURE — 96127 BRIEF EMOTIONAL/BEHAV ASSMT: CPT | Performed by: FAMILY MEDICINE

## 2023-08-09 PROCEDURE — 99214 OFFICE O/P EST MOD 30 MIN: CPT | Mod: VID | Performed by: FAMILY MEDICINE

## 2023-08-09 RX ORDER — NALTREXONE HYDROCHLORIDE 50 MG/1
50 TABLET, FILM COATED ORAL DAILY
Qty: 30 TABLET | Refills: 0 | Status: SHIPPED | OUTPATIENT
Start: 2023-08-09 | End: 2024-02-08

## 2023-08-09 RX ORDER — ESCITALOPRAM OXALATE 20 MG/1
20 TABLET ORAL DAILY
Qty: 90 TABLET | Refills: 1 | Status: SHIPPED | OUTPATIENT
Start: 2023-08-09 | End: 2023-08-21

## 2023-08-09 SDOH — SOCIAL STABILITY - SOCIAL INSECURITY: DEPENDENT RELATIVE NEEDING CARE AT HOME: Z63.6

## 2023-08-09 ASSESSMENT — ANXIETY QUESTIONNAIRES
7. FEELING AFRAID AS IF SOMETHING AWFUL MIGHT HAPPEN: NOT AT ALL
GAD7 TOTAL SCORE: 0
5. BEING SO RESTLESS THAT IT IS HARD TO SIT STILL: NOT AT ALL
3. WORRYING TOO MUCH ABOUT DIFFERENT THINGS: NOT AT ALL
2. NOT BEING ABLE TO STOP OR CONTROL WORRYING: NOT AT ALL
4. TROUBLE RELAXING: NOT AT ALL
6. BECOMING EASILY ANNOYED OR IRRITABLE: NOT AT ALL
GAD7 TOTAL SCORE: 0
1. FEELING NERVOUS, ANXIOUS, OR ON EDGE: NOT AT ALL

## 2023-08-09 ASSESSMENT — PATIENT HEALTH QUESTIONNAIRE - PHQ9
SUM OF ALL RESPONSES TO PHQ QUESTIONS 1-9: 4
SUM OF ALL RESPONSES TO PHQ QUESTIONS 1-9: 4
10. IF YOU CHECKED OFF ANY PROBLEMS, HOW DIFFICULT HAVE THESE PROBLEMS MADE IT FOR YOU TO DO YOUR WORK, TAKE CARE OF THINGS AT HOME, OR GET ALONG WITH OTHER PEOPLE: NOT DIFFICULT AT ALL

## 2023-08-09 NOTE — PROGRESS NOTES
Enrique is a 58 year old who is being evaluated via a billable video visit.      How would you like to obtain your AVS? MyChart  If the video visit is dropped, the invitation should be resent by: Text to cell phone: [phone]  Will anyone else be joining your video visit? No          Assessment & Plan     Caregiver burden  Has been worsening with drinking habit, finally started sobriety over past 2-3 months, but still feeing tired and depressed, currently he is on 10mg lexapro, will have him to increase the dose of it to 20mg for next 2-3 months and recheck   - escitalopram (LEXAPRO) 20 MG tablet; Take 1 tablet (20 mg) by mouth daily    Current moderate episode of major depressive disorder, unspecified whether recurrent (H)  Mentioned above   - escitalopram (LEXAPRO) 20 MG tablet; Take 1 tablet (20 mg) by mouth daily    Alcohol dependence in remission (H)  Mentioned above, encouraged him to titrate and wean off of it per his discretion for next 1-2 months   - naltrexone (DEPADE/REVIA) 50 MG tablet; Take 1 tablet (50 mg) by mouth daily    Pain in wrist, unspecified laterality  Has cystic grow on the radial side of wirst, limited the thumb ROM due to tightness, will have him to see sports medicine   - Orthopedic  Referral; Future           Nicotine/Tobacco Cessation:  He reports that he has been smoking cigars. He has never used smokeless tobacco.  Nicotine/Tobacco Cessation Plan:   Information offered: Patient not interested at this time      FUTURE APPOINTMENTS:       - Follow-up visit in 3 months     Lang Hernade[facility] MD  [facility]   Enrique is a 58 year old, presenting for the following health issues:  Recheck Medication and Follow Up        8/9/2023     8:09 AM   Additional Questions   Roomed by Alena BOOTHE- unable to reach       History of Present Illness       Mental Health Follow-up:  Patient presents to follow-up on Depression & Anxiety.Patient's depression since last  visit has been:  Medium  The patient is not having other symptoms associated with depression.  Patient's anxiety since last visit has been:  Good  The patient is not having other symptoms associated with anxiety.  Any significant life events: No  Patient is not feeling anxious or having panic attacks.  Patient has no concerns about alcohol or drug use.    He eats 0-1 servings of fruits and vegetables daily.He consumes 1 sweetened beverage(s) daily.He exercises with enough effort to increase his heart rate 9 or less minutes per day.  He exercises with enough effort to increase his heart rate 3 or less days per week.   He is taking medications regularly.       Review of Systems   Constitutional, HEENT, cardiovascular, pulmonary, GI, , musculoskeletal, neuro, skin, endocrine and psych systems are negative, except as otherwise noted.      Objective           Vitals:  No vitals were obtained today due to virtual visit.    Physical Exam   GENERAL: Healthy, alert and no distress  EYES: Eyes grossly normal to inspection.  No discharge or erythema, or obvious scleral/conjunctival abnormalities.  RESP: No audible wheeze, cough, or visible cyanosis.  No visible retractions or increased work of breathing.    SKIN: Visible skin clear. No significant rash, abnormal pigmentation or lesions.  NEURO: Cranial nerves grossly intact.  Mentation and speech appropriate for age.  PSYCH: Mentation appears normal, affect normal/bright, judgement and insight intact, normal speech and appearance well-groomed.                Video-Visit Details    Type of service:  Video Visit     Originating Location (pt. Location): Home    Distant Location (provider location):  On-site  Platform used for Video Visit: [employer]    Video start: 8:10  Video finish:8:43

## 2023-08-19 DIAGNOSIS — F32.1 CURRENT MODERATE EPISODE OF MAJOR DEPRESSIVE DISORDER, UNSPECIFIED WHETHER RECURRENT (H): ICD-10-CM

## 2023-08-19 DIAGNOSIS — Z63.6 CAREGIVER BURDEN: ICD-10-CM

## 2023-08-19 SDOH — SOCIAL STABILITY - SOCIAL INSECURITY: DEPENDENT RELATIVE NEEDING CARE AT HOME: Z63.6

## 2023-08-21 RX ORDER — ESCITALOPRAM OXALATE 20 MG/1
20 TABLET ORAL DAILY
Qty: 90 TABLET | Refills: 1 | Status: SHIPPED | OUTPATIENT
Start: 2023-08-21 | End: 2024-02-08

## 2023-08-21 RX ORDER — ESCITALOPRAM OXALATE 10 MG/1
10 TABLET ORAL DAILY
Qty: 15 TABLET | Refills: 0 | OUTPATIENT
Start: 2023-08-21

## 2023-08-30 NOTE — PROGRESS NOTES
Orthopaedic Surgery Hand and Upper Extremity Clinic H&P Note:  Date: Sep 5, 2023    Patient Name: Keith Medeiros  MRN: [id number]    Consult requested by: Lang Hernadez    CHIEF COMPLAINT: Left radial wrist/basilar thumb pain    Dominant Hand: right   Occupation:        HPI:  Mr. Keith Medeiros is a 58 year old male right hand dominant who presents with left wrist pain over the base of the thumb and first dorsal compartment tendons. Ongoing for 20 years. He cannot recall a particular inciting event although states he probably jammed his thumb some point in the past. The pain is located over the dorsal side of the thumb extending proximally to the radial side of the wrist. He notices it when he is active such as when he is playing golf, gripping objects, or twisting open caps. He endorses issues with .  He states the pain has prevented him from playing golf since before the pandemic. He denies any numbness or tingling. He has not tried any prior treatments or injections.     PMH  Diabetes no  Thyroid Problems no  Smoking Y/N cigars      PM  Diabetes- no  Thyroid Problems- no   Smoking Y/N- cigars       PAST MEDICAL HISTORY:  No past medical history on file.    PAST SURGICAL HISTORY:  No past surgical history on file.    MEDICATIONS:  Current Outpatient Medications   Medication     allopurinol (ZYLOPRIM) 100 MG tablet     amLODIPine (NORVASC) 10 MG tablet     escitalopram (LEXAPRO) 10 MG tablet     escitalopram (LEXAPRO) 20 MG tablet     lisinopril (ZESTRIL) 20 MG tablet     naltrexone (DEPADE/REVIA) 50 MG tablet     No current facility-administered medications for this visit.       ALLERGIES:   No Known Allergies    FAMILY HISTORY:  No pertinent family history    SOCIAL HISTORY:  Social History     Tobacco Use     Smoking status: Some Days     Types: Cigars     Smokeless tobacco: Never   Substance Use Topics     Alcohol use: Not Currently     Comment: was drinkig about 1L/day of hard alcohol, now none  for 2 weeks     Drug use: Not Currently       The patient's past medical, family, and social history was reviewed and confirmed.    REVIEW OF SYMPTOMS:      General: Negative   Eyes: Negative   Ear, Nose and Throat: Negative   Respiratory: Negative   Cardiovascular: Negative   Gastrointestinal: Negative   Genito-urinary: Negative   Musculoskeletal: Negative  Neurological: Negative   Psychological: Negative  HEME: Negative   ENDO: Negative   SKIN: Negative    VITALS:  There were no vitals filed for this visit.    EXAM:  General: NAD, A&Ox3  HEENT: NC/AT  CV: RRR by peripheral pulse  Pulmonary: Non-labored breathing on RA  MSK:   Skin intact, no deformity, no swelling  Full thumb range of motion opposition  Positive tenderness palpation over the first dorsal compartment tendons just distal to the radial styloid  Positive Finkelstein's  Mildly positive tenderness palpation over the CMC joint  5/5 EPL, FPL, hand intrinsics  Sensation intact to light touch median, radial, ulnar nerve distributions  Warm well-perfused, capillary refill less than 2 seconds      IMAGING:    None    I have personally reviewed the above images and labs.     ASSESSMENT AND PLAN:  58-year-old male with a left de Quervain's tenosynovitis and possible left thumb CMC OA    We discussed the patient's diagnosis and treatment options in detail today. This included a description of the associated pathology and non-operative/operative treatment options with the use of illustrations and diagrams.     I recommended starting with a steroid injection.  After obtaining written consent, I cleansed the skin over the radial wrist with chlorhexidine.  I then anesthetized the skin with ethyl chloride spray after which I injected 1 cc of 1% lidocaine with dexamethasone 4 mg/mL into the first dorsal compartment tendon sheath just distal to the radial styloid.     Patient was also provided with a home exercise program and informational brochure.  At his request he  was also provided with a home exercise program for his left shoulder (patient had a labral tear there many years ago treated nonoperatively).    If in 6 weeks, the patient has noted some improvement but has residual symptoms, he may return for a repeat injection.      X-rays of his left thumb will be needed at that time.    All questions answered.  The patient voiced understanding and agreement.  Follow-up me as needed.    Juan Anaya MD    Hand & Upper Extremity Surgery  Department of Orthopedic Surgery  [facility]      Hand / Upper Extremity Injection/Arthrocentesis: L extensor compartment 1    Date/Time: 9/5/2023 3:48 PM    Performed by: Juan Anaya MD  Authorized by: Juan Anaya MD    Indications:  Pain  Needle Size:  25 G  Guidance: landmark    Approach:  Volar  Condition: de Quervain's      Site:  L extensor compartment 1  Medications:  1 mL lidocaine 1 %; 1 mL dexAMETHasone 120 MG/30ML  Outcome:  Tolerated well, no immediate complications  Procedure discussed: discussed risks, benefits, and alternatives    Timeout: timeout called immediately prior to procedure    Prep: patient was prepped and draped in usual sterile fashion

## 2023-09-05 ENCOUNTER — OFFICE VISIT (OUTPATIENT)
Dept: ORTHOPEDICS | Facility: CLINIC | Age: 58
End: 2023-09-05
Attending: FAMILY MEDICINE
Payer: COMMERCIAL

## 2023-09-05 VITALS
DIASTOLIC BLOOD PRESSURE: 82 MMHG | SYSTOLIC BLOOD PRESSURE: 127 MMHG | BODY MASS INDEX: 31.83 KG/M2 | WEIGHT: 235 LBS | HEIGHT: 72 IN

## 2023-09-05 DIAGNOSIS — M25.539 PAIN IN WRIST, UNSPECIFIED LATERALITY: ICD-10-CM

## 2023-09-05 DIAGNOSIS — M65.4 DE QUERVAIN'S TENOSYNOVITIS, LEFT: Primary | ICD-10-CM

## 2023-09-05 PROCEDURE — 20550 NJX 1 TENDON SHEATH/LIGAMENT: CPT | Mod: LT | Performed by: STUDENT IN AN ORGANIZED HEALTH CARE EDUCATION/TRAINING PROGRAM

## 2023-09-05 PROCEDURE — 99204 OFFICE O/P NEW MOD 45 MIN: CPT | Mod: 25 | Performed by: STUDENT IN AN ORGANIZED HEALTH CARE EDUCATION/TRAINING PROGRAM

## 2023-09-05 RX ORDER — LIDOCAINE HYDROCHLORIDE 10 MG/ML
1 INJECTION, SOLUTION INFILTRATION; PERINEURAL
Status: SHIPPED | OUTPATIENT
Start: 2023-09-05

## 2023-09-05 RX ORDER — TESTOSTERONE CYPIONATE 200 MG/ML
1 INJECTION INTRAMUSCULAR
Status: SHIPPED | OUTPATIENT
Start: 2023-09-05

## 2023-09-05 RX ADMIN — LIDOCAINE HYDROCHLORIDE 1 ML: 10 INJECTION, SOLUTION INFILTRATION; PERINEURAL at 15:48

## 2023-09-05 RX ADMIN — TESTOSTERONE CYPIONATE 1 ML: 200 INJECTION INTRAMUSCULAR at 15:48

## 2023-09-05 NOTE — LETTER
9/5/2023         RE: Keith Medeiros  [address]  [place] [address]        Dear Colleague,    Thank you for referring your patient, Keith Medeiros, to the [facility]. Please see a copy of my visit note below.    Orthopaedic Surgery Hand and Upper Extremity Clinic H&P Note:  Date: Sep 5, 2023    Patient Name: Keith Medeiros  MRN: [id number]    Consult requested by: Lang Hernadez    CHIEF COMPLAINT: Left radial wrist/basilar thumb pain    Dominant Hand: right   Occupation:        HPI:  Mr. Keith Medeiros is a 58 year old male right hand dominant who presents with left wrist pain over the base of the thumb and first dorsal compartment tendons. Ongoing for 20 years. He cannot recall a particular inciting event although states he probably jammed his thumb some point in the past. The pain is located over the dorsal side of the thumb extending proximally to the radial side of the wrist. He notices it when he is active such as when he is playing golf, gripping objects, or twisting open caps. He endorses issues with .  He states the pain has prevented him from playing golf since before the pandemic. He denies any numbness or tingling. He has not tried any prior treatments or injections.     PMH  Diabetes no  Thyroid Problems no  Smoking Y/N cigars      PMH  Diabetes- no  Thyroid Problems- no   Smoking Y/N- cigars       PAST MEDICAL HISTORY:  No past medical history on file.    PAST SURGICAL HISTORY:  No past surgical history on file.    MEDICATIONS:  Current Outpatient Medications   Medication     allopurinol (ZYLOPRIM) 100 MG tablet     amLODIPine (NORVASC) 10 MG tablet     escitalopram (LEXAPRO) 10 MG tablet     escitalopram (LEXAPRO) 20 MG tablet     lisinopril (ZESTRIL) 20 MG tablet     naltrexone (DEPADE/REVIA) 50 MG tablet     No current facility-administered medications for this visit.       ALLERGIES:   No Known Allergies    FAMILY HISTORY:  No pertinent  family history    SOCIAL HISTORY:  Social History     Tobacco Use     Smoking status: Some Days     Types: Cigars     Smokeless tobacco: Never   Substance Use Topics     Alcohol use: Not Currently     Comment: was drinkig about 1L/day of hard alcohol, now none for 2 weeks     Drug use: Not Currently       The patient's past medical, family, and social history was reviewed and confirmed.    REVIEW OF SYMPTOMS:      General: Negative   Eyes: Negative   Ear, Nose and Throat: Negative   Respiratory: Negative   Cardiovascular: Negative   Gastrointestinal: Negative   Genito-urinary: Negative   Musculoskeletal: Negative  Neurological: Negative   Psychological: Negative  HEME: Negative   ENDO: Negative   SKIN: Negative    VITALS:  There were no vitals filed for this visit.    EXAM:  General: NAD, A&Ox3  HEENT: NC/AT  CV: RRR by peripheral pulse  Pulmonary: Non-labored breathing on RA  MSK:   Skin intact, no deformity, no swelling  Full thumb range of motion opposition  Positive tenderness palpation over the first dorsal compartment tendons just distal to the radial styloid  Positive Finkelstein's  Mildly positive tenderness palpation over the CMC joint  5/5 EPL, FPL, hand intrinsics  Sensation intact to light touch median, radial, ulnar nerve distributions  Warm well-perfused, capillary refill less than 2 seconds      IMAGING:    None    I have personally reviewed the above images and labs.     ASSESSMENT AND PLAN:  58-year-old male with a left de Quervain's tenosynovitis and possible left thumb CMC OA    We discussed the patient's diagnosis and treatment options in detail today. This included a description of the associated pathology and non-operative/operative treatment options with the use of illustrations and diagrams.     I recommended starting with a steroid injection.  After obtaining written consent, I cleansed the skin over the radial wrist with chlorhexidine.  I then anesthetized the skin with ethyl chloride spray  after which I injected 1 cc of 1% lidocaine with dexamethasone 4 mg/mL into the first dorsal compartment tendon sheath just distal to the radial styloid.     Patient was also provided with a home exercise program and informational brochure.  At his request he was also provided with a home exercise program for his left shoulder (patient had a labral tear there many years ago treated nonoperatively).    If in 6 weeks, the patient has noted some improvement but has residual symptoms, he may return for a repeat injection.      X-rays of his left thumb will be needed at that time.    All questions answered.  The patient voiced understanding and agreement.  Follow-up me as needed.    Juan Anaya MD    Hand & Upper Extremity Surgery  Department of Orthopedic Surgery  [facility]      Hand / Upper Extremity Injection/Arthrocentesis: L extensor compartment 1    Date/Time: 9/5/2023 3:48 PM    Performed by: Juan Anaya MD  Authorized by: Juan Anaya MD    Indications:  Pain  Needle Size:  25 G  Guidance: landmark    Approach:  Volar  Condition: de Quervain's      Site:  L extensor compartment 1  Medications:  1 mL lidocaine 1 %; 1 mL dexAMETHasone 120 MG/30ML  Outcome:  Tolerated well, no immediate complications  Procedure discussed: discussed risks, benefits, and alternatives    Timeout: timeout called immediately prior to procedure    Prep: patient was prepped and draped in usual sterile fashion              Again, thank you for allowing me to participate in the care of your patient.        Sincerely,        Juan Anaya MD

## 2023-12-23 DIAGNOSIS — M10.9 GOUT, UNSPECIFIED CAUSE, UNSPECIFIED CHRONICITY, UNSPECIFIED SITE: ICD-10-CM

## 2023-12-24 DIAGNOSIS — M10.9 GOUT, UNSPECIFIED CAUSE, UNSPECIFIED CHRONICITY, UNSPECIFIED SITE: ICD-10-CM

## 2023-12-26 RX ORDER — ALLOPURINOL 100 MG/1
TABLET ORAL
Qty: 90 TABLET | Refills: 3 | OUTPATIENT
Start: 2023-12-26

## 2023-12-26 RX ORDER — ALLOPURINOL 100 MG/1
TABLET ORAL
Qty: 90 TABLET | Refills: 1 | Status: SHIPPED | OUTPATIENT
Start: 2023-12-26 | End: 2024-09-05

## 2023-12-29 DIAGNOSIS — I10 BENIGN ESSENTIAL HYPERTENSION: ICD-10-CM

## 2023-12-29 RX ORDER — LISINOPRIL 20 MG/1
20 TABLET ORAL DAILY
Qty: 90 TABLET | Refills: 1 | Status: SHIPPED | OUTPATIENT
Start: 2023-12-29 | End: 2024-03-22

## 2023-12-29 RX ORDER — LISINOPRIL 20 MG/1
20 TABLET ORAL DAILY
Qty: 90 TABLET | Refills: 3 | OUTPATIENT
Start: 2023-12-29

## 2024-01-13 ENCOUNTER — HEALTH MAINTENANCE LETTER (OUTPATIENT)
Age: 59
End: 2024-01-13

## 2024-01-29 DIAGNOSIS — I10 BENIGN ESSENTIAL HYPERTENSION: ICD-10-CM

## 2024-01-29 RX ORDER — AMLODIPINE BESYLATE 10 MG/1
10 TABLET ORAL DAILY
Qty: 90 TABLET | Refills: 1 | Status: SHIPPED | OUTPATIENT
Start: 2024-01-29 | End: 2024-03-22

## 2024-02-01 ASSESSMENT — ANXIETY QUESTIONNAIRES
7. FEELING AFRAID AS IF SOMETHING AWFUL MIGHT HAPPEN: NOT AT ALL
3. WORRYING TOO MUCH ABOUT DIFFERENT THINGS: NOT AT ALL
5. BEING SO RESTLESS THAT IT IS HARD TO SIT STILL: NOT AT ALL
1. FEELING NERVOUS, ANXIOUS, OR ON EDGE: NOT AT ALL
8. IF YOU CHECKED OFF ANY PROBLEMS, HOW DIFFICULT HAVE THESE MADE IT FOR YOU TO DO YOUR WORK, TAKE CARE OF THINGS AT HOME, OR GET ALONG WITH OTHER PEOPLE?: NOT DIFFICULT AT ALL
IF YOU CHECKED OFF ANY PROBLEMS ON THIS QUESTIONNAIRE, HOW DIFFICULT HAVE THESE PROBLEMS MADE IT FOR YOU TO DO YOUR WORK, TAKE CARE OF THINGS AT HOME, OR GET ALONG WITH OTHER PEOPLE: NOT DIFFICULT AT ALL
GAD7 TOTAL SCORE: 0
6. BECOMING EASILY ANNOYED OR IRRITABLE: NOT AT ALL
2. NOT BEING ABLE TO STOP OR CONTROL WORRYING: NOT AT ALL
GAD7 TOTAL SCORE: 0
4. TROUBLE RELAXING: NOT AT ALL
7. FEELING AFRAID AS IF SOMETHING AWFUL MIGHT HAPPEN: NOT AT ALL

## 2024-02-08 ENCOUNTER — OFFICE VISIT (OUTPATIENT)
Dept: FAMILY MEDICINE | Facility: CLINIC | Age: 59
End: 2024-02-08
Payer: COMMERCIAL

## 2024-02-08 VITALS
HEART RATE: 86 BPM | DIASTOLIC BLOOD PRESSURE: 54 MMHG | BODY MASS INDEX: 33.38 KG/M2 | TEMPERATURE: 98.3 F | RESPIRATION RATE: 16 BRPM | SYSTOLIC BLOOD PRESSURE: 98 MMHG | WEIGHT: 238.4 LBS | OXYGEN SATURATION: 94 % | HEIGHT: 71 IN

## 2024-02-08 DIAGNOSIS — F32.1 CURRENT MODERATE EPISODE OF MAJOR DEPRESSIVE DISORDER, UNSPECIFIED WHETHER RECURRENT (H): ICD-10-CM

## 2024-02-08 DIAGNOSIS — I87.2 VENOUS STASIS DERMATITIS OF BOTH LOWER EXTREMITIES: ICD-10-CM

## 2024-02-08 DIAGNOSIS — I10 BENIGN ESSENTIAL HYPERTENSION: Primary | ICD-10-CM

## 2024-02-08 DIAGNOSIS — M65.4 DE QUERVAIN'S DISEASE (TENOSYNOVITIS): ICD-10-CM

## 2024-02-08 DIAGNOSIS — Z63.6 CAREGIVER BURDEN: ICD-10-CM

## 2024-02-08 PROCEDURE — 99214 OFFICE O/P EST MOD 30 MIN: CPT | Performed by: FAMILY MEDICINE

## 2024-02-08 RX ORDER — ESCITALOPRAM OXALATE 20 MG/1
20 TABLET ORAL DAILY
Qty: 90 TABLET | Refills: 3 | Status: SHIPPED | OUTPATIENT
Start: 2024-02-08

## 2024-02-08 RX ORDER — DESONIDE 0.5 MG/G
CREAM TOPICAL 2 TIMES DAILY
Qty: 60 G | Refills: 1 | Status: SHIPPED | OUTPATIENT
Start: 2024-02-08

## 2024-02-08 SDOH — SOCIAL STABILITY - SOCIAL INSECURITY: DEPENDENT RELATIVE NEEDING CARE AT HOME: Z63.6

## 2024-02-08 ASSESSMENT — PATIENT HEALTH QUESTIONNAIRE - PHQ9
SUM OF ALL RESPONSES TO PHQ QUESTIONS 1-9: 0
10. IF YOU CHECKED OFF ANY PROBLEMS, HOW DIFFICULT HAVE THESE PROBLEMS MADE IT FOR YOU TO DO YOUR WORK, TAKE CARE OF THINGS AT HOME, OR GET ALONG WITH OTHER PEOPLE: NOT DIFFICULT AT ALL
SUM OF ALL RESPONSES TO PHQ QUESTIONS 1-9: 0

## 2024-02-08 ASSESSMENT — PAIN SCALES - GENERAL: PAINLEVEL: NO PAIN (0)

## 2024-02-08 NOTE — PROGRESS NOTES
"  Assessment & Plan     Benign essential hypertension  Stable  Keep monitoring with current dose meds,     Caregiver burden  Stable  Keep monitoring   - escitalopram (LEXAPRO) 20 MG tablet; Take 1 tablet (20 mg) by mouth daily    Current moderate episode of major depressive disorder, unspecified whether recurrent (H)  Mentioned above   - escitalopram (LEXAPRO) 20 MG tablet; Take 1 tablet (20 mg) by mouth daily    Venous stasis dermatitis of both lower extremities  Worse with mottled skin and mild itching on bilateral foot and ankles, encouraged him to try topical steroid cream and leg elevation with calf pumping,   If not improving, will consider BEBETO for further evaluation    - desonide (DESOWEN) 0.05 % external cream; Apply topically 2 times daily      (M65.4) De Quervain's disease (tenosynovitis)  Comment: on left wrist, did 1st infection of corticosteroid, but not improved, encouraged him to try thumb spica brace for next 4-6 weeks and follow up, will consider referral back or sending 2nd opinion  Plan: mentioned above           BMI  Estimated body mass index is 33.56 kg/m  as calculated from the following:    Height as of this encounter: 1.795 m (5' 10.67\").    Weight as of this encounter: 108.1 kg (238 lb 6.4 oz).   Weight management plan: Discussed healthy diet and exercise guidelines      FUTURE APPOINTMENTS:       - Follow-up visit in 6 weeks for CPE    Subjective   Enrique is a 58 year old, presenting for the following health issues:  MH Follow Up, Hypertension, and Follow Up        2/8/2024     2:37 PM   Additional Questions   Roomed by Alena BOOTHE     History of Present Illness       Mental Health Follow-up:  Patient presents to follow-up on Depression & Anxiety.Patient's depression since last visit has been:  Good  The patient is not having other symptoms associated with depression.  Patient's anxiety since last visit has been:  Good  The patient is not having other symptoms associated with anxiety.  Any " "significant life events: No  Patient is not feeling anxious or having panic attacks.  Patient has no concerns about alcohol or drug use.    Hypertension: He presents for follow up of hypertension.  He does check blood pressure  regularly outside of the clinic. Outpatient blood pressures have not been over 140/90. He does not follow a low salt diet.     Reason for visit:  General health. Foot problems- swelling    He eats 0-1 servings of fruits and vegetables daily.He consumes 0 sweetened beverage(s) daily.He exercises with enough effort to increase his heart rate 9 or less minutes per day.  He exercises with enough effort to increase his heart rate 3 or less days per week.   He is taking medications regularly.           Review of Systems  Constitutional, neuro, ENT, endocrine, pulmonary, cardiac, gastrointestinal, genitourinary, musculoskeletal, integument and psychiatric systems are negative, except as otherwise noted.      Objective    BP 98/54 (BP Location: Left arm, Patient Position: Sitting, Cuff Size: Adult Large)   Pulse 86   Temp 98.3  F (36.8  C) (Tympanic)   Resp 16   Ht 1.795 m (5' 10.67\")   Wt 108.1 kg (238 lb 6.4 oz)   SpO2 94%   BMI 33.56 kg/m    Body mass index is 33.56 kg/m .  Physical Exam   GENERAL: alert and no distress  EYES: Eyes grossly normal to inspection, PERRL and conjunctivae and sclerae normal  HENT: ear canals and TM's normal, nose and mouth without ulcers or lesions  NECK: no adenopathy, no asymmetry, masses, or scars  RESP: lungs clear to auscultation - no rales, rhonchi or wheezes  CV: regular rate and rhythm, normal S1 S2, no S3 or S4, no murmur, click or rub, no peripheral edema  ABDOMEN: soft, nontender, no hepatosplenomegaly, no masses and bowel sounds normal  MS: no gross musculoskeletal defects noted, no edema  NEURO: Normal strength and tone, mentation intact and speech normal  BACK: no CVA tenderness, no paralumbar tenderness            Signed Electronically by: Lang LAMB" MD Satya

## 2024-03-05 ENCOUNTER — MYC REFILL (OUTPATIENT)
Dept: FAMILY MEDICINE | Facility: CLINIC | Age: 59
End: 2024-03-05
Payer: COMMERCIAL

## 2024-03-05 DIAGNOSIS — M10.9 GOUT, UNSPECIFIED CAUSE, UNSPECIFIED CHRONICITY, UNSPECIFIED SITE: ICD-10-CM

## 2024-03-05 RX ORDER — ALLOPURINOL 100 MG/1
100 TABLET ORAL DAILY
Qty: 90 TABLET | Refills: 1 | OUTPATIENT
Start: 2024-03-05

## 2024-03-16 SDOH — HEALTH STABILITY: PHYSICAL HEALTH: ON AVERAGE, HOW MANY MINUTES DO YOU ENGAGE IN EXERCISE AT THIS LEVEL?: 0 MIN

## 2024-03-16 SDOH — HEALTH STABILITY: PHYSICAL HEALTH: ON AVERAGE, HOW MANY DAYS PER WEEK DO YOU ENGAGE IN MODERATE TO STRENUOUS EXERCISE (LIKE A BRISK WALK)?: 0 DAYS

## 2024-03-16 ASSESSMENT — SOCIAL DETERMINANTS OF HEALTH (SDOH): HOW OFTEN DO YOU GET TOGETHER WITH FRIENDS OR RELATIVES?: THREE TIMES A WEEK

## 2024-03-16 NOTE — COMMUNITY RESOURCES LIST (ENGLISH)
March 16, 2024           YOUR PERSONALIZED LIST OF SERVICES & PROGRAMS           & RECREATION    Sports      for Resources - Sports clubs and recreational activities  [address] [place] [address] (Distance: 3.7 miles)  Phone: [phone]  Website: [email or web address]/  Language: English  Fee: Free      of [place] - Sports clubs and recreational activities - [facility]  [address] [place] [address] (Distance: 6.7 miles)  Language: English  Fee: Self pay, Sliding scale      [facility] - Adult Enrichment  Phone: [phone]  Website: [email or web address]  Language: English  Hours: Mon 7:30 AM - 4:00 PM Tue 7:30 AM - 4:00 PM Wed 7:30 AM - 4:00 PM Thu 7:30 AM - 4:00 PM Fri 7:30 AM - 4:00 PM    Classes/Groups      Fitness - [place] - Group fitness classes - [facility] - [address] [address] (Distance: 5.0 miles)  Language: English  Fee: Free, Insurance, Self pay      Fitness - [place] - Personal training - [facility] - [address] [address] (Distance: 5.0 miles)  Language: English  Fee: Insurance, Self pay      Workouts - Exercise Class/At Home Workouts  Website: https://homeworkouts.org/  Language: English               IMPORTANT NUMBERS & WEBSITES        Emergency Services  911  .   United Way  211 http://211unitedway.org  .   Poison Control  (877) 198-5855 http://[place]poison.org http://w[place]poison.org  .     Suicide and Crisis Lifeline  988 http://988lifeline.org  .   Childhelp National Child Abuse Hotline  799.846.4917 http://Childhelphotline.org   .   National Sexual Assault Hotline  (499) 928-1469 (HOPE) http://Rainn.org   .     National Runaway Safeline  (575) 394-9153 (RUNAWAY) http://1800runaway.org  .   Pregnancy & Postpartum Support  Call/text 322-931-4210  [place]: http://[place] Healthupport[place].org  [place]:  http://psichapters.com/wi  .   Substance Abuse National Helpline (SAMHSA)  800-622-HELP (5650) http://Findtreatment.gov   .                DISCLAIMER: [employer] does not endorse any service providers mentioned in this resource list. [employer] does not guarantee that the services mentioned in this resource list will be available to you or will improve your health or wellness.    [facility]

## 2024-03-22 ENCOUNTER — OFFICE VISIT (OUTPATIENT)
Dept: FAMILY MEDICINE | Facility: CLINIC | Age: 59
End: 2024-03-22
Payer: COMMERCIAL

## 2024-03-22 VITALS
HEART RATE: 78 BPM | SYSTOLIC BLOOD PRESSURE: 125 MMHG | RESPIRATION RATE: 16 BRPM | TEMPERATURE: 97.8 F | OXYGEN SATURATION: 94 % | DIASTOLIC BLOOD PRESSURE: 75 MMHG | WEIGHT: 237.1 LBS | BODY MASS INDEX: 33.19 KG/M2 | HEIGHT: 71 IN

## 2024-03-22 DIAGNOSIS — M10.9 GOUT, UNSPECIFIED CAUSE, UNSPECIFIED CHRONICITY, UNSPECIFIED SITE: ICD-10-CM

## 2024-03-22 DIAGNOSIS — F10.21 ALCOHOL DEPENDENCE IN REMISSION (H): ICD-10-CM

## 2024-03-22 DIAGNOSIS — Z12.11 SCREEN FOR COLON CANCER: ICD-10-CM

## 2024-03-22 DIAGNOSIS — I10 BENIGN ESSENTIAL HYPERTENSION: ICD-10-CM

## 2024-03-22 DIAGNOSIS — Z12.5 SCREENING FOR PROSTATE CANCER: ICD-10-CM

## 2024-03-22 DIAGNOSIS — Z00.00 ROUTINE GENERAL MEDICAL EXAMINATION AT A HEALTH CARE FACILITY: Primary | ICD-10-CM

## 2024-03-22 LAB
ALBUMIN SERPL BCG-MCNC: 4.4 G/DL (ref 3.5–5.2)
ALP SERPL-CCNC: 79 U/L (ref 40–150)
ALT SERPL W P-5'-P-CCNC: 32 U/L (ref 0–70)
ANION GAP SERPL CALCULATED.3IONS-SCNC: 9 MMOL/L (ref 7–15)
AST SERPL W P-5'-P-CCNC: 21 U/L (ref 0–45)
BILIRUB SERPL-MCNC: 0.2 MG/DL
BUN SERPL-MCNC: 14.3 MG/DL (ref 6–20)
CALCIUM SERPL-MCNC: 9.7 MG/DL (ref 8.6–10)
CHLORIDE SERPL-SCNC: 103 MMOL/L (ref 98–107)
CHOLEST SERPL-MCNC: 208 MG/DL
CREAT SERPL-MCNC: 1.12 MG/DL (ref 0.67–1.17)
DEPRECATED HCO3 PLAS-SCNC: 28 MMOL/L (ref 22–29)
EGFRCR SERPLBLD CKD-EPI 2021: 76 ML/MIN/1.73M2
ERYTHROCYTE [DISTWIDTH] IN BLOOD BY AUTOMATED COUNT: 12.1 % (ref 10–15)
FASTING STATUS PATIENT QL REPORTED: YES
GLUCOSE SERPL-MCNC: 103 MG/DL (ref 70–99)
HCT VFR BLD AUTO: 45.3 % (ref 40–53)
HDLC SERPL-MCNC: 29 MG/DL
HGB BLD-MCNC: 15.2 G/DL (ref 13.3–17.7)
LDLC SERPL CALC-MCNC: 134 MG/DL
MCH RBC QN AUTO: 29.5 PG (ref 26.5–33)
MCHC RBC AUTO-ENTMCNC: 33.6 G/DL (ref 31.5–36.5)
MCV RBC AUTO: 88 FL (ref 78–100)
NONHDLC SERPL-MCNC: 179 MG/DL
PLATELET # BLD AUTO: 207 10E3/UL (ref 150–450)
POTASSIUM SERPL-SCNC: 4.4 MMOL/L (ref 3.4–5.3)
PROT SERPL-MCNC: 7.1 G/DL (ref 6.4–8.3)
PSA SERPL DL<=0.01 NG/ML-MCNC: 1.09 NG/ML (ref 0–3.5)
RBC # BLD AUTO: 5.15 10E6/UL (ref 4.4–5.9)
SODIUM SERPL-SCNC: 140 MMOL/L (ref 135–145)
TRIGL SERPL-MCNC: 223 MG/DL
URATE SERPL-MCNC: 5.8 MG/DL (ref 3.4–7)
WBC # BLD AUTO: 7.4 10E3/UL (ref 4–11)

## 2024-03-22 PROCEDURE — G0103 PSA SCREENING: HCPCS | Performed by: FAMILY MEDICINE

## 2024-03-22 PROCEDURE — 84550 ASSAY OF BLOOD/URIC ACID: CPT | Performed by: FAMILY MEDICINE

## 2024-03-22 PROCEDURE — 99214 OFFICE O/P EST MOD 30 MIN: CPT | Mod: 25 | Performed by: FAMILY MEDICINE

## 2024-03-22 PROCEDURE — 99396 PREV VISIT EST AGE 40-64: CPT | Performed by: FAMILY MEDICINE

## 2024-03-22 PROCEDURE — 85027 COMPLETE CBC AUTOMATED: CPT | Performed by: FAMILY MEDICINE

## 2024-03-22 PROCEDURE — 80053 COMPREHEN METABOLIC PANEL: CPT | Performed by: FAMILY MEDICINE

## 2024-03-22 PROCEDURE — 80061 LIPID PANEL: CPT | Performed by: FAMILY MEDICINE

## 2024-03-22 PROCEDURE — 36415 COLL VENOUS BLD VENIPUNCTURE: CPT | Performed by: FAMILY MEDICINE

## 2024-03-22 RX ORDER — LISINOPRIL 20 MG/1
20 TABLET ORAL DAILY
Qty: 90 TABLET | Refills: 3 | Status: SHIPPED | OUTPATIENT
Start: 2024-03-22

## 2024-03-22 RX ORDER — AMLODIPINE BESYLATE 10 MG/1
10 TABLET ORAL DAILY
Qty: 90 TABLET | Refills: 3 | Status: SHIPPED | OUTPATIENT
Start: 2024-03-22

## 2024-03-22 ASSESSMENT — ANXIETY QUESTIONNAIRES
1. FEELING NERVOUS, ANXIOUS, OR ON EDGE: NOT AT ALL
IF YOU CHECKED OFF ANY PROBLEMS ON THIS QUESTIONNAIRE, HOW DIFFICULT HAVE THESE PROBLEMS MADE IT FOR YOU TO DO YOUR WORK, TAKE CARE OF THINGS AT HOME, OR GET ALONG WITH OTHER PEOPLE: NOT DIFFICULT AT ALL
2. NOT BEING ABLE TO STOP OR CONTROL WORRYING: NOT AT ALL
7. FEELING AFRAID AS IF SOMETHING AWFUL MIGHT HAPPEN: NOT AT ALL
3. WORRYING TOO MUCH ABOUT DIFFERENT THINGS: NOT AT ALL
6. BECOMING EASILY ANNOYED OR IRRITABLE: NOT AT ALL
GAD7 TOTAL SCORE: 0
GAD7 TOTAL SCORE: 0
5. BEING SO RESTLESS THAT IT IS HARD TO SIT STILL: NOT AT ALL

## 2024-03-22 ASSESSMENT — PATIENT HEALTH QUESTIONNAIRE - PHQ9
5. POOR APPETITE OR OVEREATING: NOT AT ALL
SUM OF ALL RESPONSES TO PHQ QUESTIONS 1-9: 0

## 2024-03-22 ASSESSMENT — PAIN SCALES - GENERAL: PAINLEVEL: NO PAIN (0)

## 2024-03-22 NOTE — PATIENT INSTRUCTIONS
Preventive Care Advice   This is general advice given by our system to help you stay healthy. However, your care team may have specific advice just for you. Please talk to your care team about your preventive care needs.  Nutrition  Eat 5 or more servings of fruits and vegetables each day.  Try wheat bread, brown rice and whole grain pasta (instead of white bread, rice, and pasta).  Get enough calcium and vitamin D. Check the label on foods and aim for 100% of the RDA (recommended daily allowance).  Lifestyle  Exercise at least 150 minutes each week   (30 minutes a day, 5 days a week).  Do muscle strengthening activities 2 days a week. These help control your weight and prevent disease.  No smoking.  Wear sunscreen to prevent skin cancer.  Have a dental exam and cleaning every 6 months.  Yearly exams  See your health care team every year to talk about:  Any changes in your health.  Any medicines your care team has prescribed.  Preventive care, family planning, and ways to prevent chronic diseases.  Shots (vaccines)   HPV shots (up to age 26), if you've never had them before.  Hepatitis B shots (up to age 59), if you've never had them before.  COVID-19 shot: Get this shot when it's due.  Flu shot: Get a flu shot every year.  Tetanus shot: Get a tetanus shot every 10 years.  Pneumococcal, hepatitis A, and RSV shots: Ask your care team if you need these based on your risk.  Shingles shot (for age 50 and up).  General health tests  Diabetes screening:  Starting at age 35, Get screened for diabetes at least every 3 years.  If you are younger than age 35, ask your care team if you should be screened for diabetes.  Cholesterol test: At age 39, start having a cholesterol test every 5 years, or more often if advised.  Bone density scan (DEXA): At age 50, ask your care team if you should have this scan for osteoporosis (brittle bones).  Hepatitis C: Get tested at least once in your life.  STIs (sexually transmitted  infections)  Before age 24: Ask your care team if you should be screened for STIs.  After age 24: Get screened for STIs if you're at risk. You are at risk for STIs (including HIV) if:  You are sexually active with more than one person.  You don't use condoms every time.  You or a partner was diagnosed with a sexually transmitted infection.  If you are at risk for HIV, ask about PrEP medicine to prevent HIV.  Get tested for HIV at least once in your life, whether you are at risk for HIV or not.  Cancer screening tests  Cervical cancer screening: If you have a cervix, begin getting regular cervical cancer screening tests at age 21. Most people who have regular screenings with normal results can stop after age 65. Talk about this with your provider.  Breast cancer scan (mammogram): If you've ever had breasts, begin having regular mammograms starting at age 40. This is a scan to check for breast cancer.  Colon cancer screening: It is important to start screening for colon cancer at age 45.  Have a colonoscopy test every 10 years (or more often if you're at risk) Or, ask your provider about stool tests like a FIT test every year or Cologuard test every 3 years.  To learn more about your testing options, visit: https://www.[employer]/567832.pdf.  For help making a decision, visit: https://bit.ly/yh97451.  Prostate cancer screening test: If you have a prostate and are age 55 to 69, ask your provider if you would benefit from a yearly prostate cancer screening test.  Lung cancer screening: If you are a current or former smoker age 50 to 80, ask your care team if ongoing lung cancer screenings are right for you.  For informational purposes only. Not to replace the advice of your health care provider. Copyright   2023 [employer]. All rights reserved. Clinically reviewed by the [facility] Transitions Program. [employer] [id number] - REV 01/24.

## 2024-03-22 NOTE — PROGRESS NOTES
Preventive Care Visit  [facility] LUCAS Hernadez MD, Family Medicine  Mar 22, 2024      Assessment & Plan     Routine general medical examination at a health care facility    - Fecal colorectal cancer screen FIT - Future (S+30); Future  - COMPREHENSIVE METABOLIC PANEL; Future  - CBC with platelets; Future  - Lipid panel reflex to direct LDL Fasting; Future  - PSA, screen; Future  - Uric acid; Future    Screen for colon cancer    - Fecal colorectal cancer screen FIT - Future (S+30); Future    Benign essential hypertension    - COMPREHENSIVE METABOLIC PANEL; Future  - amLODIPine (NORVASC) 10 MG tablet; Take 1 tablet (10 mg) by mouth daily  - lisinopril (ZESTRIL) 20 MG tablet; Take 1 tablet (20 mg) by mouth daily    Screening for prostate cancer    - PSA, screen; Future    Gout, unspecified cause, unspecified chronicity, unspecified site    - Uric acid; Future    Alcohol dependence in remission (H)      Patient has been advised of split billing requirements and indicates understanding: Yes      Counseling  Appropriate preventive services were discussed with this patient, including applicable screening as appropriate for fall prevention, nutrition, physical activity, Tobacco-use cessation, weight loss and cognition.  Checklist reviewing preventive services available has been given to the patient.  Reviewed patient's diet, addressing concerns and/or questions.       FUTURE APPOINTMENTS:       - Follow-up visit in 1 year     Bulmaro Nunez is a 58 year old, presenting for the following:  Physical (Fasting.)        3/22/2024     7:14 AM   Additional Questions   Roomed by Beti HOLLIS        Health Care Directive  Patient does not have a Health Care Directive or Living Will: Discussed advance care planning with patient; however, patient declined at this time.    Healthy Habits:     Getting at least 3 servings of Calcium per day:  Yes    Bi-annual eye exam:  Yes    Dental care twice a year:  Yes     Sleep apnea or symptoms of sleep apnea:  None    Diet:  Regular (no restrictions)    Frequency of exercise:  None    Duration of exercise:  N/A    Taking medications regularly:  Yes    Barriers to taking medications:  None    Medication side effects:  None    Additional concerns today:  No        3/16/2024   General Health   How would you rate your overall physical health? Excellent   Feel stress (tense, anxious, or unable to sleep) Not at all         3/16/2024   Nutrition   Three or more servings of calcium each day? Yes   Diet: Regular (no restrictions)   How many servings of fruit and vegetables per day? (!) 0-1   How many sweetened beverages each day? 0-1         3/16/2024   Exercise   Days per week of moderate/strenous exercise 0 days   Average minutes spent exercising at this level 0 min   (!) EXERCISE CONCERN      3/16/2024   Social Factors   Frequency of gathering with friends or relatives Three times a week   Worry food won't last until get money to buy more No   Food not last or not have enough money for food? No   Do you have housing?  Yes   Are you worried about losing your housing? No   Lack of transportation? No   Unable to get utilities (heat,electricity)? No         3/16/2024   Fall Risk   Fallen 2 or more times in the past year? No   Trouble with walking or balance? No          3/16/2024   Dental   Dentist two times every year? Yes         3/16/2024   TB Screening   Were you born outside of the [place]? No       Today's PHQ-9 Score:       3/22/2024     7:17 AM   PHQ-9 SCORE   PHQ-9 Total Score 0         3/16/2024   Substance Use   If I could quit smoking, I would Completely agree   I want to quit somking, worry about health affects Completely disagree   Willing to make a plan to quit smoking Completely disagree   Willing to cut down before quitting Completely disagree   Alcohol more than 3/day or more than 7/wk Not Applicable   Do you use any other substances recreationally? No     Social History      Tobacco Use    Smoking status: Some Days     Types: Cigars    Smokeless tobacco: Never   Vaping Use    Vaping Use: Never used   Substance Use Topics    Alcohol use: Not Currently     Comment: was drinkig about 1L/day of hard alcohol, now none for 2 weeks    Drug use: Not Currently             3/16/2024   One time HIV Screening   Previous HIV test? No         3/16/2024   STI Screening   New sexual partner(s) since last STI/HIV test? No   Last PSA:   Prostate Specific Antigen Screen   Date Value Ref Range Status   02/07/2023 1.82 0.00 - 3.50 ng/mL Final   08/02/2021 2.20 0.00 - 4.00 ug/L Final     ASCVD Risk   The 10-year ASCVD risk score (Brian GARCIA, et al., 2019) is: 19.8%    Values used to calculate the score:      Age: 58 years      Sex: Male      Is Non- : No      Diabetic: No      Tobacco smoker: Yes      Systolic Blood Pressure: 125 mmHg      Is BP treated: Yes      HDL Cholesterol: 31 mg/dL      Total Cholesterol: 204 mg/dL           Reviewed and updated as needed this visit by Provider                    Past Medical History:   Diagnosis Date    Depressive disorder     Hypertension      Past Surgical History:   Procedure Laterality Date    APPENDECTOMY       BP Readings from Last 3 Encounters:   03/22/24 125/75   02/08/24 98/54   09/05/23 127/82    Wt Readings from Last 3 Encounters:   03/22/24 107.5 kg (237 lb 1.6 oz)   02/08/24 108.1 kg (238 lb 6.4 oz)   09/05/23 106.6 kg (235 lb)                  Patient Active Problem List   Diagnosis    Current moderate episode of major depressive disorder, unspecified whether recurrent (H)    Alcohol dependence in remission (H)     Past Surgical History:   Procedure Laterality Date    APPENDECTOMY         Social History     Tobacco Use    Smoking status: Some Days     Types: Cigars    Smokeless tobacco: Never   Substance Use Topics    Alcohol use: Not Currently     Comment: was drinkig about 1L/day of hard alcohol, now none for 2  "weeks     No family history on file.      Current Outpatient Medications   Medication Sig Dispense Refill    allopurinol (ZYLOPRIM) 100 MG tablet TAKE 1 TABLET(100 MG) BY MOUTH DAILY 90 tablet 1    amLODIPine (NORVASC) 10 MG tablet Take 1 tablet (10 mg) by mouth daily 90 tablet 3    desonide (DESOWEN) 0.05 % external cream Apply topically 2 times daily 60 g 1    escitalopram (LEXAPRO) 20 MG tablet Take 1 tablet (20 mg) by mouth daily 90 tablet 3    lisinopril (ZESTRIL) 20 MG tablet Take 1 tablet (20 mg) by mouth daily 90 tablet 3     No Known Allergies  Recent Labs   Lab Test 02/07/23  0801 08/02/21  1756   * 112*   HDL 31* 43   TRIG 235* 336*   ALT 38 51   CR 1.09 1.01   GFRESTIMATED 79 83   POTASSIUM 3.9 3.7          Review of Systems  Constitutional, HEENT, cardiovascular, pulmonary, GI, , musculoskeletal, neuro, skin, endocrine and psych systems are negative, except as otherwise noted.     Objective    Exam  /75   Pulse 78   Temp 97.8  F (36.6  C) (Temporal)   Resp 16   Ht 1.805 m (5' 11.06\")   Wt 107.5 kg (237 lb 1.6 oz)   SpO2 94%   BMI 33.01 kg/m     Estimated body mass index is 33.01 kg/m  as calculated from the following:    Height as of this encounter: 1.805 m (5' 11.06\").    Weight as of this encounter: 107.5 kg (237 lb 1.6 oz).    Physical Exam  GENERAL: alert and no distress  EYES: Eyes grossly normal to inspection, PERRL and conjunctivae and sclerae normal  HENT: ear canals and TM's normal, nose and mouth without ulcers or lesions  NECK: no adenopathy, no asymmetry, masses, or scars  RESP: lungs clear to auscultation - no rales, rhonchi or wheezes  CV: regular rate and rhythm, normal S1 S2, no S3 or S4, no murmur, click or rub, no peripheral edema  ABDOMEN: soft, nontender, no hepatosplenomegaly, no masses and bowel sounds normal  MS: no gross musculoskeletal defects noted, no edema  SKIN: no suspicious lesions or rashes  NEURO: Normal strength and tone, mentation intact and " speech normal  BACK: no CVA tenderness, no paralumbar tenderness  PSYCH: mentation appears normal, affect normal/bright  LYMPH: no cervical, supraclavicular, axillary, or inguinal adenopathy        Signed Electronically by: Lang Hernadez MD

## 2024-03-24 PROCEDURE — 82274 ASSAY TEST FOR BLOOD FECAL: CPT | Performed by: FAMILY MEDICINE

## 2024-03-28 LAB — HEMOCCULT STL QL IA: NEGATIVE

## 2024-06-09 DIAGNOSIS — M10.9 GOUT, UNSPECIFIED CAUSE, UNSPECIFIED CHRONICITY, UNSPECIFIED SITE: ICD-10-CM

## 2024-06-10 RX ORDER — ALLOPURINOL 100 MG/1
TABLET ORAL
Qty: 90 TABLET | Refills: 1 | OUTPATIENT
Start: 2024-06-10

## 2024-09-05 DIAGNOSIS — M10.9 GOUT, UNSPECIFIED CAUSE, UNSPECIFIED CHRONICITY, UNSPECIFIED SITE: ICD-10-CM

## 2024-09-05 RX ORDER — ALLOPURINOL 100 MG/1
TABLET ORAL
Qty: 90 TABLET | Refills: 1 | Status: SHIPPED | OUTPATIENT
Start: 2024-09-05

## 2024-09-17 ENCOUNTER — TRANSFERRED RECORDS (OUTPATIENT)
Dept: HEALTH INFORMATION MANAGEMENT | Facility: CLINIC | Age: 59
End: 2024-09-17
Payer: COMMERCIAL